# Patient Record
Sex: FEMALE | Race: WHITE | Employment: OTHER | URBAN - METROPOLITAN AREA
[De-identification: names, ages, dates, MRNs, and addresses within clinical notes are randomized per-mention and may not be internally consistent; named-entity substitution may affect disease eponyms.]

---

## 2017-04-10 ENCOUNTER — HOSPITAL ENCOUNTER (OUTPATIENT)
Dept: MAMMOGRAPHY | Facility: HOSPITAL | Age: 52
Discharge: HOME | End: 2017-04-10
Attending: OBSTETRICS & GYNECOLOGY
Payer: COMMERCIAL

## 2017-04-10 DIAGNOSIS — N63.0 BREAST MASS: ICD-10-CM

## 2017-04-10 PROCEDURE — 77066 DX MAMMO INCL CAD BI: CPT

## 2017-04-10 PROCEDURE — 77062 BREAST TOMOSYNTHESIS BI: CPT

## 2017-04-10 PROCEDURE — 76641 ULTRASOUND BREAST COMPLETE: CPT

## 2017-10-30 ENCOUNTER — HOSPITAL ENCOUNTER (OUTPATIENT)
Dept: MAMMOGRAPHY | Facility: HOSPITAL | Age: 52
Discharge: HOME | End: 2017-10-30
Attending: OBSTETRICS & GYNECOLOGY
Payer: COMMERCIAL

## 2017-10-30 DIAGNOSIS — N63.0 BREAST LUMP: ICD-10-CM

## 2017-10-30 PROCEDURE — 76642 ULTRASOUND BREAST LIMITED: CPT | Performed by: OBSTETRICS & GYNECOLOGY

## 2017-10-30 PROCEDURE — 77066 DX MAMMO INCL CAD BI: CPT | Performed by: OBSTETRICS & GYNECOLOGY

## 2017-10-30 PROCEDURE — 77062 BREAST TOMOSYNTHESIS BI: CPT | Performed by: OBSTETRICS & GYNECOLOGY

## 2017-10-30 NOTE — IMAGING NOTE
Asssisted Dr. London Olivas with recommendation for a left US breast biopsy for nodule. Emotional and educational support provided. Written information provided to Yaima Domingo.  Our breast center schedulers will call pt within 72 hours to schedule an appointmen

## 2017-12-13 PROBLEM — F41.1 ANXIETY STATE: Status: ACTIVE | Noted: 2017-12-13

## 2017-12-27 ENCOUNTER — HOSPITAL ENCOUNTER (OUTPATIENT)
Dept: MAMMOGRAPHY | Facility: HOSPITAL | Age: 52
Discharge: HOME | End: 2017-12-27
Attending: OBSTETRICS & GYNECOLOGY
Payer: COMMERCIAL

## 2017-12-27 DIAGNOSIS — N63.0 BREAST NODULE: ICD-10-CM

## 2017-12-27 PROCEDURE — 77065 DX MAMMO INCL CAD UNI: CPT | Performed by: OBSTETRICS & GYNECOLOGY

## 2017-12-27 PROCEDURE — 88305 TISSUE EXAM BY PATHOLOGIST: CPT | Performed by: OBSTETRICS & GYNECOLOGY

## 2017-12-27 PROCEDURE — 19083 BX BREAST 1ST LESION US IMAG: CPT | Performed by: OBSTETRICS & GYNECOLOGY

## 2017-12-27 NOTE — PROCEDURES
PROCEDURE: ULTRASOUND GUIDED BIOPSY OF THE LEFT BREAST, ONE SITE    COMPARISON: [facility] ADDISON 2D+3D DIAGNOSTIC MEREDITH CHEEK (FRU=[id number]), 10/30/2017, 13:09.     INDICATIONS: N63.0 Unspecified lump in unspecified breast    DESCRIPTION: The patient’s p

## 2018-01-02 ENCOUNTER — TELEPHONE (OUTPATIENT)
Dept: MAMMOGRAPHY | Facility: HOSPITAL | Age: 53
End: 2018-01-02

## 2018-05-24 ENCOUNTER — SURGERY (OUTPATIENT)
Age: 53
End: 2018-05-24

## 2018-05-24 ENCOUNTER — HOSPITAL ENCOUNTER (EMERGENCY)
Facility: HOSPITAL | Age: 53
Discharge: HOME | End: 2018-05-24
Attending: EMERGENCY MEDICINE | Admitting: INTERNAL MEDICINE
Payer: COMMERCIAL

## 2018-05-24 VITALS
TEMPERATURE: 98 F | HEART RATE: 72 BPM | SYSTOLIC BLOOD PRESSURE: 100 MMHG | WEIGHT: 127.88 LBS | BODY MASS INDEX: 22 KG/M2 | DIASTOLIC BLOOD PRESSURE: 55 MMHG | OXYGEN SATURATION: 96 % | RESPIRATION RATE: 18 BRPM

## 2018-05-24 DIAGNOSIS — T18.108A FOREIGN BODY IN ESOPHAGUS, INITIAL ENCOUNTER: Primary | ICD-10-CM

## 2018-05-24 PROBLEM — Z87.19 HISTORY OF ESOPHAGEAL STRICTURE: Status: ACTIVE | Noted: 2018-05-24

## 2018-05-24 PROCEDURE — 99285 EMERGENCY DEPT VISIT HI MDM: CPT

## 2018-05-24 PROCEDURE — 99152 MOD SED SAME PHYS/QHP 5/>YRS: CPT | Performed by: INTERNAL MEDICINE

## 2018-05-24 PROCEDURE — 96361 HYDRATE IV INFUSION ADD-ON: CPT

## 2018-05-24 PROCEDURE — 0DC38ZZ EXTIRPATION OF MATTER FROM LOWER ESOPHAGUS, VIA NATURAL OR ARTIFICIAL OPENING ENDOSCOPIC: ICD-10-PCS | Performed by: INTERNAL MEDICINE

## 2018-05-24 PROCEDURE — 96374 THER/PROPH/DIAG INJ IV PUSH: CPT

## 2018-05-24 RX ORDER — DIPHENHYDRAMINE HYDROCHLORIDE 50 MG/ML
INJECTION INTRAMUSCULAR; INTRAVENOUS
Status: DISCONTINUED | OUTPATIENT
Start: 2018-05-24 | End: 2018-05-24

## 2018-05-24 RX ORDER — SODIUM CHLORIDE, SODIUM LACTATE, POTASSIUM CHLORIDE, CALCIUM CHLORIDE 600; 310; 30; 20 MG/100ML; MG/100ML; MG/100ML; MG/100ML
INJECTION, SOLUTION INTRAVENOUS CONTINUOUS
Status: DISCONTINUED | OUTPATIENT
Start: 2018-05-24 | End: 2018-05-24

## 2018-05-24 RX ORDER — MIDAZOLAM HYDROCHLORIDE 1 MG/ML
INJECTION INTRAMUSCULAR; INTRAVENOUS
Status: DISCONTINUED | OUTPATIENT
Start: 2018-05-24 | End: 2018-05-24

## 2018-05-24 NOTE — OPERATIVE REPORT
PATIENT NAME: Gordo Morris  MRN: [id number]  DATE OF OPERATION: 5/24/2018  REFERRING PHYSICIAN: ER doctor  Medications:  Versed 4 mg IVP              Fentanyl 100 mcg IVP Benadryl 25 mg IVP  PREOPERATIVE DIAGNOSIS    Esophageal obstruction  POSTOPERATIVE D

## 2018-05-24 NOTE — ED INITIAL ASSESSMENT (HPI)
Pt states she has a piece of steak stuck in her esophagus, unable to tolerate her saliva, no respiratory distress.

## 2018-05-24 NOTE — CONSULTS
GASTROENTEROLOGY CONSULTATION  Franko Ashley MD    Department of Gastroenterology  [facility]    Johanne Pierson Patient Status:  Emergency    1965 MRN [id number]   [facility] ENDOSCOPY Attending No att. providers found in hair or nails. Bone/joint: No joint pain or inflammation  Heme/Lymphatic: Denies easy bruising, anemia, excessive bleeding, enlarging or painful lymph nodes.   Allergy: Denies medication allergy, latex/rubber allergy, anaphylactic or other reaction to a

## 2018-05-24 NOTE — ED PROVIDER NOTES
Patient Seen in: [facility] Emergency Department    History   Patient presents with:  FB in Throat (GI, respiratory)    Stated Complaint: foreign body    HPI    Patient complains of swallowed esophageal foreign body.   Patient was eating some steak a purulent nasal secretions or overlying sinus erythema. Throat: Posterior pharynx is normal.  Uvula midline nonswollen. Tongue is nonswollen. Oromucosa is moist.   Neck: Supple, nontender, with no crepitance  Lungs: Clear to auscultation bilaterally.   No

## 2018-05-24 NOTE — BRIEF OP NOTE
Pre-Operative Diagnosis: Food bolus     Post-Operative Diagnosis: esophageal obstruction from food bolus.   Esophageal stricture - incomplete egd     Procedure Performed:   Procedure(s):  ESOPHAGOGASTRODUODENOSCOPY    Surgeon(s) and Role:     * Rafi Dickens

## 2018-11-01 PROBLEM — G47.00 INSOMNIA: Status: ACTIVE | Noted: 2018-11-01

## 2018-11-01 PROBLEM — F32.A DEPRESSION: Status: ACTIVE | Noted: 2018-11-01

## 2019-06-10 ENCOUNTER — APPOINTMENT (OUTPATIENT)
Dept: GENERAL RADIOLOGY | Age: 54
End: 2019-06-10
Attending: FAMILY MEDICINE
Payer: COMMERCIAL

## 2019-06-10 ENCOUNTER — HOSPITAL ENCOUNTER (OUTPATIENT)
Age: 54
Discharge: HOME | End: 2019-06-10
Attending: FAMILY MEDICINE
Payer: COMMERCIAL

## 2019-06-10 VITALS
WEIGHT: 130 LBS | HEIGHT: 64 IN | TEMPERATURE: 99 F | BODY MASS INDEX: 22.2 KG/M2 | DIASTOLIC BLOOD PRESSURE: 80 MMHG | SYSTOLIC BLOOD PRESSURE: 147 MMHG | OXYGEN SATURATION: 100 % | HEART RATE: 67 BPM | RESPIRATION RATE: 17 BRPM

## 2019-06-10 DIAGNOSIS — R07.89 ACUTE CHEST WALL PAIN: Primary | ICD-10-CM

## 2019-06-10 PROCEDURE — 85378 FIBRIN DEGRADE SEMIQUANT: CPT | Performed by: FAMILY MEDICINE

## 2019-06-10 PROCEDURE — 99205 OFFICE O/P NEW HI 60 MIN: CPT

## 2019-06-10 PROCEDURE — 71046 X-RAY EXAM CHEST 2 VIEWS: CPT | Performed by: FAMILY MEDICINE

## 2019-06-10 PROCEDURE — 85025 COMPLETE CBC W/AUTO DIFF WBC: CPT | Performed by: FAMILY MEDICINE

## 2019-06-10 PROCEDURE — 80047 BASIC METABLC PNL IONIZED CA: CPT

## 2019-06-10 PROCEDURE — 93005 ELECTROCARDIOGRAM TRACING: CPT

## 2019-06-10 PROCEDURE — 96374 THER/PROPH/DIAG INJ IV PUSH: CPT

## 2019-06-10 PROCEDURE — 84484 ASSAY OF TROPONIN QUANT: CPT

## 2019-06-10 PROCEDURE — 99215 OFFICE O/P EST HI 40 MIN: CPT

## 2019-06-10 PROCEDURE — 93010 ELECTROCARDIOGRAM REPORT: CPT

## 2019-06-10 RX ORDER — KETOROLAC TROMETHAMINE 30 MG/ML
30 INJECTION, SOLUTION INTRAMUSCULAR; INTRAVENOUS ONCE
Status: COMPLETED | OUTPATIENT
Start: 2019-06-10 | End: 2019-06-10

## 2019-06-10 NOTE — ED PROVIDER NOTES
Patient Seen in: [facility]    History   Patient presents with:  Chest Pain    Stated Complaint: chest pain radiating into back    HPI    This 52-year-old female presents to the office with complaint of left-sided chest pain whi HPI.  Constitutional and vital signs reviewed. All other systems reviewed and negative except as noted above.     Physical Exam     ED Triage Vitals [06/10/19 1207]   /78   Pulse 79   Resp 18   Temp 98.6 °F (37 °C)   Temp src Temporal   SpO2 100 since 0530. Patient also has pain in her left shoulder blade that increases with movement. FINDINGS:  Lung volumes are large which may reflect underlying central or reactive airways disease. No focal consolidation or pleural effusion.   There a few scat doctor in 3 to 5 days if not improving.

## 2019-06-10 NOTE — ED INITIAL ASSESSMENT (HPI)
Patient states developed mid sternal chest pain and states radiates to the back  Took 600 mg ibuprofen with no relief  Denies any SOB  Denies any cold symptoms or cough

## 2021-01-25 ENCOUNTER — TELEPHONE (OUTPATIENT)
Dept: INTERNAL MEDICINE CLINIC | Facility: CLINIC | Age: 56
End: 2021-01-25

## 2021-01-25 DIAGNOSIS — R05.9 COUGH: Primary | ICD-10-CM

## 2021-01-25 NOTE — TELEPHONE ENCOUNTER
Pt's  Manuel Rosenberg) is a current pt under Dr Ruth Ferris [facility]. Pt's  requested a covid19 due to \"persistant cough for a few weeks\"    Per psr pt's  stated pt will be signing up for [facility] as well. Lab test pending.

## 2021-01-25 NOTE — TELEPHONE ENCOUNTER
Order signed. Praneeth Erwin. Emiliano Mathias MD  Diplomate, American Board of Internal Medicine  Member, [facility] ([email or web address])  Affiliate Physician, [facility] ([email or web address])

## 2021-01-26 ENCOUNTER — LAB ENCOUNTER (OUTPATIENT)
Dept: LAB | Facility: HOSPITAL | Age: 56
End: 2021-01-26
Attending: INTERNAL MEDICINE
Payer: COMMERCIAL

## 2021-01-26 DIAGNOSIS — R05.9 COUGH: ICD-10-CM

## 2021-01-27 LAB — SARS-COV-2 RNA RESP QL NAA+PROBE: NOT DETECTED

## 2021-02-16 ENCOUNTER — OFFICE VISIT (OUTPATIENT)
Dept: INTERNAL MEDICINE CLINIC | Facility: CLINIC | Age: 56
End: 2021-02-16
Payer: COMMERCIAL

## 2021-02-16 VITALS
DIASTOLIC BLOOD PRESSURE: 82 MMHG | OXYGEN SATURATION: 97 % | HEART RATE: 79 BPM | HEIGHT: 64.5 IN | WEIGHT: 130 LBS | SYSTOLIC BLOOD PRESSURE: 128 MMHG | TEMPERATURE: 98 F | BODY MASS INDEX: 21.93 KG/M2

## 2021-02-16 DIAGNOSIS — Z12.31 SCREENING MAMMOGRAM, ENCOUNTER FOR: ICD-10-CM

## 2021-02-16 DIAGNOSIS — Z13.6 SCREENING FOR HEART DISEASE: ICD-10-CM

## 2021-02-16 DIAGNOSIS — Z12.12 SCREENING FOR COLORECTAL CANCER: ICD-10-CM

## 2021-02-16 DIAGNOSIS — Z00.00 ROUTINE GENERAL MEDICAL EXAMINATION AT A HEALTH CARE FACILITY: Primary | ICD-10-CM

## 2021-02-16 DIAGNOSIS — Z12.11 SCREENING FOR COLORECTAL CANCER: ICD-10-CM

## 2021-02-16 PROCEDURE — 3008F BODY MASS INDEX DOCD: CPT | Performed by: INTERNAL MEDICINE

## 2021-02-16 PROCEDURE — 3079F DIAST BP 80-89 MM HG: CPT | Performed by: INTERNAL MEDICINE

## 2021-02-16 PROCEDURE — 3074F SYST BP LT 130 MM HG: CPT | Performed by: INTERNAL MEDICINE

## 2021-02-16 PROCEDURE — 99386 PREV VISIT NEW AGE 40-64: CPT | Performed by: INTERNAL MEDICINE

## 2021-02-17 PROBLEM — F32.A DEPRESSION: Status: RESOLVED | Noted: 2018-11-01 | Resolved: 2021-02-17

## 2021-02-17 PROBLEM — Z87.19 HISTORY OF ESOPHAGEAL STRICTURE: Status: RESOLVED | Noted: 2018-05-24 | Resolved: 2021-02-17

## 2021-02-17 PROBLEM — T18.108A FOREIGN BODY IN ESOPHAGUS, INITIAL ENCOUNTER: Status: RESOLVED | Noted: 2018-05-24 | Resolved: 2021-02-17

## 2021-02-17 PROBLEM — F41.1 ANXIETY STATE: Status: RESOLVED | Noted: 2017-12-13 | Resolved: 2021-02-17

## 2021-02-17 NOTE — PROGRESS NOTES
Patient presents with:  Establish Care: here to establish care.       HPI: Jerel Galvez is a 49 y/o WF, new patient, here to establish PCP and to undergo [facility] Annual Wellness Program. She can return to our lab to undergo appropriate testing of the various compone grossly intact, no focal deficits; 2+ DTRs  Psych - nl mood/affect      A/P:  Routine general medical examination at a health care facility  (primary encounter diagnosis)  Screening mammogram, encounter for  Screening for heart disease  Screening for color

## 2021-03-03 ENCOUNTER — NURSE ONLY (OUTPATIENT)
Dept: INTERNAL MEDICINE CLINIC | Facility: CLINIC | Age: 56
End: 2021-03-03
Payer: COMMERCIAL

## 2021-03-03 VITALS — BODY MASS INDEX: 22.53 KG/M2 | WEIGHT: 132 LBS | HEIGHT: 64 IN

## 2021-03-03 DIAGNOSIS — Z00.00 LABORATORY EXAM ORDERED AS PART OF ROUTINE GENERAL MEDICAL EXAMINATION: Primary | ICD-10-CM

## 2021-03-03 LAB
AMB EXT CHOLESTEROL, TOTAL: 162 MG/DL
AMB EXT COVID-19 IGG: POSITIVE
AMB EXT CREATININE: 0.76 MG/DL
AMB EXT GLUCOSE: 93 MG/DL
AMB EXT HDL CHOLESTEROL: 84 MG/DL
AMB EXT HEMATOCRIT: 40.8
AMB EXT HEMOGLOBIN: 13.7
AMB EXT LDL CHOLESTEROL, DIRECT: 64 MG/DL
AMB EXT MCV: 92.7
AMB EXT PLATELETS: 224
AMB EXT TRIGLYCERIDES: 55 MG/DL
AMB EXT TSH: 2.51 MIU/ML
AMB EXT WBC: 8.8 X10(3)UL
BILIRUB UR QL STRIP.AUTO: NEGATIVE
CLARITY UR REFRACT.AUTO: CLEAR
COLOR UR AUTO: YELLOW
GLUCOSE UR STRIP.AUTO-MCNC: NEGATIVE MG/DL
KETONES UR STRIP.AUTO-MCNC: NEGATIVE MG/DL
LEUKOCYTE ESTERASE UR QL STRIP.AUTO: NEGATIVE
NITRITE UR QL STRIP.AUTO: NEGATIVE
PH UR STRIP.AUTO: 7 [PH] (ref 5–8)
PROT UR STRIP.AUTO-MCNC: NEGATIVE MG/DL
RBC UR QL AUTO: NEGATIVE
SP GR UR STRIP.AUTO: 1.01 (ref 1–1.03)
UROBILINOGEN UR STRIP.AUTO-MCNC: <2 MG/DL

## 2021-03-03 PROCEDURE — 3008F BODY MASS INDEX DOCD: CPT | Performed by: INTERNAL MEDICINE

## 2021-03-03 PROCEDURE — 93000 ELECTROCARDIOGRAM COMPLETE: CPT | Performed by: INTERNAL MEDICINE

## 2021-03-03 PROCEDURE — 99173 VISUAL ACUITY SCREEN: CPT | Performed by: INTERNAL MEDICINE

## 2021-03-03 PROCEDURE — 92551 PURE TONE HEARING TEST AIR: CPT | Performed by: INTERNAL MEDICINE

## 2021-03-03 PROCEDURE — 81003 URINALYSIS AUTO W/O SCOPE: CPT | Performed by: INTERNAL MEDICINE

## 2021-03-03 NOTE — PROGRESS NOTES
*BODY COMPOSITION:    YES: X      NO:       REASON TEST NOT PERFORMED:   _____________________________________________________________________________  *VENIPUNCTURE    YES:  X     NO:        REASON VENIPUNCTURE NOT PERFORMED:      LEFT A/C:     LEFT HAND:

## 2021-03-12 ENCOUNTER — MED REC SCAN ONLY (OUTPATIENT)
Dept: INTERNAL MEDICINE CLINIC | Facility: CLINIC | Age: 56
End: 2021-03-12

## 2021-03-18 ENCOUNTER — TELEPHONE (OUTPATIENT)
Dept: INTERNAL MEDICINE CLINIC | Facility: CLINIC | Age: 56
End: 2021-03-18

## 2021-03-18 NOTE — TELEPHONE ENCOUNTER
Left message X2 for patient to call to make phone appointment for Dr. Rockne Najjar to review test results from CPE. Most recent call was 3/12/21. No response as of today.

## 2021-03-23 ENCOUNTER — VIRTUAL PHONE E/M (OUTPATIENT)
Dept: INTERNAL MEDICINE CLINIC | Facility: CLINIC | Age: 56
End: 2021-03-23
Payer: COMMERCIAL

## 2021-03-23 DIAGNOSIS — R89.9 ABNORMAL LABORATORY TEST RESULT: Primary | ICD-10-CM

## 2021-03-23 PROCEDURE — 99214 OFFICE O/P EST MOD 30 MIN: CPT | Performed by: INTERNAL MEDICINE

## 2021-03-23 NOTE — PROGRESS NOTES
Virtual Telephone Check-In    Molly Naranjo verbally consents to a Virtual/Telephone Check-In visit on 03/23/21. Patient has been referred to the [facility] website at [email or web address] to review the yearly Consent to Treat document.     Patient un lbs].    Body Composition Analysis via [employer] Professional machine done on 3/3/21:  Current Body Weight: 132.0 lbs. Total Body Fat: 26.7 % and 34.7 lbs. Visceral Fat: 5. Fat-Free Mass: 73.2 % and 95.3 lbs. Total Body Water: 51.1 % and 66.5 lbs. in this encounter       Imaging & Consults:  None

## 2021-03-28 ENCOUNTER — LAB ENCOUNTER (OUTPATIENT)
Dept: LAB | Facility: HOSPITAL | Age: 56
End: 2021-03-28
Attending: INTERNAL MEDICINE
Payer: COMMERCIAL

## 2021-03-28 DIAGNOSIS — Z01.818 PRE-OP TESTING: ICD-10-CM

## 2021-03-31 PROBLEM — D12.3 BENIGN NEOPLASM OF TRANSVERSE COLON: Status: ACTIVE | Noted: 2021-03-31

## 2021-03-31 PROBLEM — Z12.11 SPECIAL SCREENING FOR MALIGNANT NEOPLASMS, COLON: Status: ACTIVE | Noted: 2021-03-31

## 2021-11-09 ENCOUNTER — HOSPITAL ENCOUNTER (OUTPATIENT)
Dept: MAMMOGRAPHY | Facility: HOSPITAL | Age: 56
Discharge: HOME | End: 2021-11-09
Attending: INTERNAL MEDICINE
Payer: COMMERCIAL

## 2021-11-09 DIAGNOSIS — Z12.31 SCREENING MAMMOGRAM, ENCOUNTER FOR: ICD-10-CM

## 2021-11-09 PROCEDURE — 77067 SCR MAMMO BI INCL CAD: CPT | Performed by: INTERNAL MEDICINE

## 2021-11-09 PROCEDURE — 77063 BREAST TOMOSYNTHESIS BI: CPT | Performed by: INTERNAL MEDICINE

## 2021-11-12 ENCOUNTER — HOSPITAL ENCOUNTER (OUTPATIENT)
Dept: MAMMOGRAPHY | Facility: HOSPITAL | Age: 56
Discharge: HOME | End: 2021-11-12
Attending: INTERNAL MEDICINE
Payer: COMMERCIAL

## 2021-11-12 DIAGNOSIS — R92.2 INCONCLUSIVE MAMMOGRAM: ICD-10-CM

## 2021-11-12 PROCEDURE — 77061 BREAST TOMOSYNTHESIS UNI: CPT | Performed by: INTERNAL MEDICINE

## 2021-11-12 PROCEDURE — 76642 ULTRASOUND BREAST LIMITED: CPT | Performed by: INTERNAL MEDICINE

## 2021-11-12 PROCEDURE — 77065 DX MAMMO INCL CAD UNI: CPT | Performed by: INTERNAL MEDICINE

## 2022-01-26 ENCOUNTER — TELEPHONE (OUTPATIENT)
Dept: INTERNAL MEDICINE CLINIC | Facility: CLINIC | Age: 57
End: 2022-01-26

## 2022-01-26 NOTE — TELEPHONE ENCOUNTER
Patient injured her left knee 3 weeks ago while attending ballet classes. Patient took a break from ballet, however today climbing stairs the left knee collapsed. Its hard to apply any weight on the left knee.  Please call

## 2022-01-26 NOTE — TELEPHONE ENCOUNTER
Pt called w c/o L knee pain r/t injury. - Per pt able to put weight some weight, however, feels knee megan   - Onset: X3 weeks ago, after ballet injury.  increased pain today after going up the stairs.   - Location: L knee  - Radiation: No  - Type of p

## 2022-01-27 ENCOUNTER — OFFICE VISIT (OUTPATIENT)
Dept: INTERNAL MEDICINE CLINIC | Facility: CLINIC | Age: 57
End: 2022-01-27
Payer: COMMERCIAL

## 2022-01-27 VITALS
RESPIRATION RATE: 16 BRPM | OXYGEN SATURATION: 96 % | WEIGHT: 125.81 LBS | BODY MASS INDEX: 21.48 KG/M2 | HEIGHT: 64 IN | DIASTOLIC BLOOD PRESSURE: 78 MMHG | TEMPERATURE: 98 F | HEART RATE: 107 BPM | SYSTOLIC BLOOD PRESSURE: 136 MMHG

## 2022-01-27 DIAGNOSIS — S83.115A: Primary | ICD-10-CM

## 2022-01-27 DIAGNOSIS — M23.92 INTERNAL DERANGEMENT OF LEFT KNEE: ICD-10-CM

## 2022-01-27 DIAGNOSIS — M12.562 TRAUMATIC ARTHROPATHY OF LEFT KNEE: ICD-10-CM

## 2022-01-27 PROCEDURE — 3075F SYST BP GE 130 - 139MM HG: CPT | Performed by: INTERNAL MEDICINE

## 2022-01-27 PROCEDURE — 99214 OFFICE O/P EST MOD 30 MIN: CPT | Performed by: INTERNAL MEDICINE

## 2022-01-27 PROCEDURE — 3078F DIAST BP <80 MM HG: CPT | Performed by: INTERNAL MEDICINE

## 2022-01-27 PROCEDURE — 3008F BODY MASS INDEX DOCD: CPT | Performed by: INTERNAL MEDICINE

## 2022-01-27 RX ORDER — IBUPROFEN 200 MG
200 TABLET ORAL EVERY 6 HOURS PRN
COMMUNITY

## 2022-01-27 NOTE — PROGRESS NOTES
Patient presents with:  Knee Pain: L knee pain       HPI: The patient presents for L knee pain evaluation:  Onset/Duration = 3 weeks ago  Location = L knee, medial aspect  Radiation = none  Mechanism of injury = she started taking ballet classes  Quality o : 130 lb (59 kg)  05/24/18 : 127 lb 13.9 oz (58 kg)  Gen - A&Ox3, NAD  L knee - grossly swollen; (+) Isidro's; there is tenderness along with medial joint line as well as with moderate palpation of the patella itself; abnormal laxity of the patella; (-)

## 2022-02-02 ENCOUNTER — OFFICE VISIT (OUTPATIENT)
Dept: ORTHOPEDICS CLINIC | Facility: CLINIC | Age: 57
End: 2022-02-02
Payer: COMMERCIAL

## 2022-02-02 VITALS — HEIGHT: 64 IN | BODY MASS INDEX: 20.49 KG/M2 | WEIGHT: 120 LBS

## 2022-02-02 DIAGNOSIS — S83.005A DISLOCATION OF LEFT PATELLA, INITIAL ENCOUNTER: Primary | ICD-10-CM

## 2022-02-02 DIAGNOSIS — S83.232A COMPLEX TEAR OF MEDIAL MENISCUS OF LEFT KNEE AS CURRENT INJURY, INITIAL ENCOUNTER: ICD-10-CM

## 2022-02-02 PROCEDURE — 99204 OFFICE O/P NEW MOD 45 MIN: CPT | Performed by: ORTHOPAEDIC SURGERY

## 2022-02-02 PROCEDURE — 3008F BODY MASS INDEX DOCD: CPT | Performed by: ORTHOPAEDIC SURGERY

## 2022-02-02 RX ORDER — MELOXICAM 15 MG/1
15 TABLET ORAL DAILY
Qty: 14 TABLET | Refills: 1 | Status: SHIPPED | OUTPATIENT
Start: 2022-02-02

## 2022-02-08 ENCOUNTER — TELEPHONE (OUTPATIENT)
Dept: PHYSICAL THERAPY | Facility: HOSPITAL | Age: 57
End: 2022-02-08

## 2022-02-09 ENCOUNTER — OFFICE VISIT (OUTPATIENT)
Dept: PHYSICAL THERAPY | Age: 57
End: 2022-02-09
Attending: ORTHOPAEDIC SURGERY
Payer: COMMERCIAL

## 2022-02-09 DIAGNOSIS — S83.005A DISLOCATION OF LEFT PATELLA, INITIAL ENCOUNTER: ICD-10-CM

## 2022-02-09 PROCEDURE — 97140 MANUAL THERAPY 1/> REGIONS: CPT

## 2022-02-09 PROCEDURE — 97161 PT EVAL LOW COMPLEX 20 MIN: CPT

## 2022-02-09 PROCEDURE — 97110 THERAPEUTIC EXERCISES: CPT

## 2022-02-09 NOTE — PROGRESS NOTES
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 I took a 20-year hiatus and then tried to get back into it. I started getting back into it in the fall, just once a week. I think this initially happened while I was working out at home, I felt a crack in my knee. I was able to finish the workout. I went to do a workout at a new studio and then had pain in my knees after the first workout. I did three workouts that week and it didn't feel good. I went and did some exercises at home to try and get it better and I went back and did one class with wearing a brace. After the next class, I still had issues. Two days later, while wearing the brace, my knee collapsed on me while I was carrying laundry up the stairs. I pushed the kneecap back in place and felt a crack. I've been afraid to go up stairs with the left leg ever since, which was two weeks ago. I immediately called my internist who did an x-ray and an MRI. The ortho went over the MRI. There's two tears in the meniscus and a partial tear in the ACL. \"    \"I have pain everyday. Yesterday, I picked up some dog poop in the backyard and even just that, I had pain coming out of that. I can tell that the left leg is starting to atrophy because I'm doing a lot of my left knee. \"     DOI/S: 1/6/22    Aggravating Factors: sitting cross-legged, standing > 10-15 minutes, walking long distances, stairs. Alleviating Factors: anti-inflammatory medication     PMH: The patient's PMH was reviewed with the patient including allergies, medications, and surgical and medical history. No previous knee injuries.      PLF/Personal Goals: ballet, hike and walk with her dog, 3-5 miles. Looking for shoe recommendations. Occupation: stay-at-home mom     OBJECTIVE:     Pain/Symptom Presentation: Patient reports pain over the medial knee. Pain at rest: 2/10  Pain at worst: 6-7/10    Activity Measures:  Sleeping: Mild Activity Limitation: Patient reports throbbing over the medial side of the knee. Sitting: Mild Activity Limitation: Patient is able to sit for up to 30 minutes before disruption due to symptoms. Standing: Moderate Activity Limitation: Patient is able to stand < 10-15 minutes before disruption due to symptoms. Walking: Moderate Activity Limitation: Patient is able to walk < 1 mile before disruption due to symptoms. Driving: No Activity Limitation: Patient is able to drive without limitations. Squatting: Moderate Activity Limitation: Patient is able to squat < 45deg before disruption due to symptoms. Ascending Stairs: Severe Activity Limitation: Patient navigates up the stairs is only able to ascend stairs with step-to gait pattern,   Descending Stairs: Severe Activity Limitation: Patient navigates down the stairs with step-to gait pattern. Stairs: Severe Activity Limitation: Patient is able to navigate 1-2 flights of stairs with step-to gait pattern. Inspection/Observation: Patient demonstrates mild suprapatellar swelling over left knee. No apparent bruising or deformity. Gait: Patient demonstrates antalgic gait upon entering the clinic, limiting stance time on her involved LE. Palpation: Patient demonstrates diffuse swelling in the posterior knee. Demonstrates good PFJ mobility. Sensation: Patient report intact sensation.      ROM:   Knee Motion PROM AROM    Right Left Right Left   Knee Extension N/A N/A 0 0   Knee Flexion 140 125* N/A N/a   *indicates activity was associated with pain    Strength/MMT:   Knee Motion Strength    Right Left   Knee Extension 5/5 3-/5   Knee Flexion 4+/5 3-/5   *indicates activity was associated with pain    Hip Motion Strength    Right Left   Hip Flexion 5/5 4/5   Hip Extension 4/5 4-/5   Hip ABD 4/5 4-/5   Hip ER 5/5 4/5   Hip IR  5/5 4/5   *indicates activity was associated with pain    Special Tests:   Knee Special Tests:   Patellofemoral:   Patellar Apprehension: (R) (-), (L) (-)  Patellar Compression: (R) (-), (L) (-)  Ligamentous:  Varus Stress Test: (R) (-), (L) (-)  Valgus Stress Test: (R) (-), (L) (+)  Anterior Drawer: (R) (-), (L) (+)  Lachman's: (R) (-), (L) (+)  Posterior Drawer: (R) (-), (L) (-)  Meniscal:   Joint Line Tenderness: (+)  Pain with passive end range flexion: (+)  Pain with passive end range extension: (-)   Isidro's: (R) (-), (L) (+)    ASSESSMENT:     Toshia Lauren is a 55year old female that presents to physical therapy evaluation s/p (L) knee injury sustained 1/6/22 when she was carrying a load of laundry up the stairs and her knee buckled on her, collapsing medially. MRI findings show an posterior horn medial meniscus tear, anterior lateral meniscal tear, and ACL sprain. Clinical findings may indicate possible MCL strain as well. The patient is with complaints of suprapatellar swelling in her knee and episodes of hyperextension instability at her left knee. The patient was educated on the rationale for physical therapy and the poor likelihood for spontaneous healing on the meniscus. The patient demonstrates antalgic gait upon entering the clinic as well as anticipated deficits in pain management and strength associated with injury. The patient admits that she has been avoiding stairs since her injury, navigating up and down with a step-to gait pattern. Current functional limitation include but are not limited to prolonged standing, walking long distances, squatting, and navigating stairs. The patient would like to resume recreational ballet participation as well hiking long distances with her dog.  The patient would benefit from physical therapy to facilitate normalized knee pain management, swelling management, ROM, strength, endurance, and stability for full return to ADL's and PLF. Precautions/WB Status: WBAT  Education or Treatment Limitation(s): None  Rehab Potential: Good    TREATMENT:     Initial Evaluation: x 10min     Therapeutic Exercise: x 15min  DL bridging: x 20   S/L clamshell: 2 x 10 (B), red theraband  Patient Education: Patient was educated on anatomy and pathophysiology of current condition, rationale for physical therapy, anticipated treatment interventions, prognosis, timeline for recovery, and expected functional outcomes based on evaluative findings. Patient was educated on the importance of compliance with consistent treatment and HEP to achieve mutually established goals. All patient's questions were answered and the patient denied further comments, complaints, or concerns upon departure. Administered HEP: Reviewed HEP handout, exercise selection, and recommended resistance. Provided verbal and written instructions/cueing for proper technique and common errors/compensations as needed. Neuromuscular Re-education: x 10min  Quad set: 2 x 10 x 5\" (L) with towel roll behind knee (for quad activation)  SLR: attempted but d/c due to pain  SAQ: x 20 (L) (for quad activation)     Manual Therapy: x 10min  Soft tissue mobilization to left knee: quads, adductors, ITB/VL, posterior knee    Home Exercise Program: Patient was issued a HEP handout 2/9/2022 including quad set, SAQ, DL bridging, and S/L clamshell. Charges: PT Eval Low Complexity Complexity x 1, Therex x 1, MT x 1  Total Timed Treatment: 35min       Total Treatment Time: 45min     PLAN OF CARE:      Goals:  Short-Term Goals:  1. The patient will improve quad strength and single-leg stance stability to demonstrate non-antalgic gait pattern with walking medium-length distances for household ambulation. Timeframe: 2-3 weeks.   2. The patient will improve LE strength and endurance to facilitate intermittent standing for extended periods of time for 2-3 hours daily for household and community ambulation. Timeframe: 2-3 weeks. Long-Term Goals:  1. The patient will improve LE strength and endurance to facilitate walking distances of 1-2 mile(s) daily for community integration. Timeframe: 4 weeks. 2. Patient will improve lower motor control to perform double-leg squat with symmetrical loading, without asymmetries, and with proper posterior chain loading to facilitate squatting and lifting ADL's. Timeframe: 4 weeks. 3. Patient will improve LE mobility, strength, and single-leg stabilization to meet strength requirements for reciprocal stair navigation pattern with no asymmetries for ascending and descending at least 3 flights of stairs daily for household and community ambulation. Timeframe: 6 weeks. Plan Frequency / Duration: Patient will be seen for 2x/week for 6 weeks, for a total of 12 visits, over a 90 day period. We will re-evaluate the patient at that time in order to determine functional progress, evaluate short-term goal completion, and establish an updated plan of care. Possible treatment interventions will/may include: Therapeutic Activities, Therapeutic Exercise, Neuromuscular Re-education, Manual Therapy, Home Exercise Program Instruction, Patient Education, Self-Care/Home Management, Gait Training, and Modalities as needed. Patient/Family was advised of these findings, precautions, and treatment options and has agreed to actively participate in planning and for this course of care. Thank you for your referral. Please co-sign or sign and return this letter via fax as soon as possible to [phone]. If you have any questions, please contact me at Dept: [phone].     Sincerely,    X___Hayley Sosa PT, DPT, SCS, ATC, CSCS____ Date: __2/9/2022__________    Electronically signed by therapist: Heidi Mendez PT  [de-identified] certification required: Yes  I certify the need for these services furnished under this plan of treatment and while under my care.

## 2022-02-11 ENCOUNTER — OFFICE VISIT (OUTPATIENT)
Dept: PHYSICAL THERAPY | Age: 57
End: 2022-02-11
Attending: ORTHOPAEDIC SURGERY
Payer: COMMERCIAL

## 2022-02-11 PROCEDURE — 97112 NEUROMUSCULAR REEDUCATION: CPT

## 2022-02-11 PROCEDURE — 97140 MANUAL THERAPY 1/> REGIONS: CPT

## 2022-02-11 PROCEDURE — 97110 THERAPEUTIC EXERCISES: CPT

## 2022-02-11 NOTE — PROGRESS NOTES
Date of Service: 2/11/2022  Dx: Dislocation of left patella, subsequent encounter (S83.005D)                Insurance: [address] [place] [address]  Insurance Limits: 60 visit limit  Visit #: 2  Authorized # of Visits: N/A  POC/Auth Expiration: N/A  Date of Last PN: 2/9/22 (Visit #1)  Authorizing Physician/Provider: Donta Urbano MD visit: N/A  Fall Risk: Standard         Precautions: None        Subjective: The patient reports compliance with her HEP which she is doing on the floor at home. The patient reports that icing before bed did seem to help her ability to sleep through the night. Pain/Symptom Presentation: Patient reports some anterior knee pain. Pain at rest: 0/10  Pain at worst: 4-5/10    Objective:   ROM:   Knee Motion PROM AROM    Right Left Right Left   Knee Extension N/A N/A 0 0   Knee Flexion 140 125* N/A N/a   *indicates activity was associated with pain    Strength/MMT:   Knee Motion Strength    Right Left   Knee Extension 5/5 3-/5   Knee Flexion 4+/5 3-/5   *indicates activity was associated with pain    Hip Motion Strength    Right Left   Hip Flexion 5/5 4/5   Hip Extension 4/5 4-/5   Hip ABD 4/5 4-/5   Hip ER 5/5 4/5   Hip IR  5/5 4/5   *indicates activity was associated with pain    Treatment:    Therapeutic Exercise: x 15min  DL bridging: x 20   S/L clamshell: 2 x 10 (B), red theraband  S/L hip ABD: 2 x 10 (B)   Standing hip ABD: attempted but d/c due to pain    Neuromuscular Re-education: x 15min  Quad set: 2 x 10 x 5\" (L) with towel roll behind knee (for quad activation)  SAQ: x 20 (L) (for quad activation)   CKC knee extension into SB: 2 x 10 x 3\" (L)   Tandem stance balance x 20\" (B)     Manual Therapy: x 10min  Soft tissue mobilization to left knee: quads, adductors, ITB/VL, posterior knee    Provider Interactions With Patient:   Verbal and manual cueing on proper performance of the prescribed exercises.    Progressed activity/exercise intensity following assessment of program, performance, and tolerance. Assessment: Jose Hoskins is still with medial knee pain and complaints of \"weakness\" likely due to ligamentous strain to that area. The patient shows improved quad activation this date. We progressed hip ABD/glute strengthening exercises. In attempting to add some WB exercises, the patient did have some mild medial knee pain, so we limited these activities. The patient would benefit from continued physical therapy to further progression in strength and stability of the left knee. Goals:   Short-Term Goals:  1. The patient will improve quad strength and single-leg stance stability to demonstrate non-antalgic gait pattern with walking medium-length distances for household ambulation. Timeframe: 2-3 weeks. 2. The patient will improve LE strength and endurance to facilitate intermittent standing for extended periods of time for 2-3 hours daily for household and community ambulation. Timeframe: 2-3 weeks. Long-Term Goals:  1. The patient will improve LE strength and endurance to facilitate walking distances of 1-2 mile(s) daily for community integration. Timeframe: 4 weeks. 2. Patient will improve lower motor control to perform double-leg squat with symmetrical loading, without asymmetries, and with proper posterior chain loading to facilitate squatting and lifting ADL's. Timeframe: 4 weeks. Patient will improve LE mobility, strength, and single-leg stabilization to meet strength requirements for reciprocal stair navigation pattern with no asymmetries for ascending and descending at least 3 flights of stairs daily for household and community ambulation. Timeframe: 6 weeks    Plan: Continue to progress LE strengthening and dynamic knee stability. HEP: Patient was issued a HEP handout 2/9/2022 including quad set, SAQ, DL bridging, and S/L clamshell.        Charges: MT x 1, Therex x 1, NMR x 1         Total Timed Treatment: 40min    Total Treatment Time: 40min

## 2022-02-14 ENCOUNTER — OFFICE VISIT (OUTPATIENT)
Dept: PHYSICAL THERAPY | Age: 57
End: 2022-02-14
Attending: ORTHOPAEDIC SURGERY
Payer: COMMERCIAL

## 2022-02-14 PROCEDURE — 97140 MANUAL THERAPY 1/> REGIONS: CPT

## 2022-02-14 PROCEDURE — 97110 THERAPEUTIC EXERCISES: CPT

## 2022-02-14 PROCEDURE — 97112 NEUROMUSCULAR REEDUCATION: CPT

## 2022-02-14 NOTE — PROGRESS NOTES
Date of Service: 2/14/2022  Dx: Dislocation of left patella, subsequent encounter (S83.005D)                Insurance: [address] [place] [address]  Insurance Limits: 60 visit limit  Visit #: 3  Authorized # of Visits: N/A  POC/Auth Expiration: N/A  Date of Last PN: 2/9/22 (Visit #1)  Authorizing Physician/Provider: Donta Urbano MD visit: N/A  Fall Risk: Standard         Precautions: None        Subjective: \"What I'm noticing is that if I do anything a little extra, I'm having some more pain in the back of the knee. If I give it a good rub, then I feel like I warm up the area. It surprised me that it was swelling up because it felt like my knee would give out again. \"    Pain/Symptom Presentation: Patient denies any changes in pain ratings 2/14/22.      Pain at rest: 0/10  Pain at worst: 4-5/10    Objective:   ROM:   Knee Motion PROM AROM    Right Left Right Left   Knee Extension N/A N/A 0 0   Knee Flexion 140 125* N/A N/a   *indicates activity was associated with pain    Strength/MMT:   Knee Motion Strength    Right Left   Knee Extension 5/5 3-/5   Knee Flexion 4+/5 3-/5   *indicates activity was associated with pain    Hip Motion Strength    Right Left   Hip Flexion 5/5 4/5   Hip Extension 4/5 4-/5   Hip ABD 4/5 4-/5   Hip ER 5/5 4/5   Hip IR  5/5 4/5   *indicates activity was associated with pain    Treatment:    Therapeutic Exercise: x 20min  DL bridging: x 20   S/L clamshell: 2 x 10 (B), red theraband  S/L hip ABD: 2 x 10 (B)   Shuttle DLP: x 20, 2 cords  Shuttle SLP x 20 (L) 1 cord    Neuromuscular Re-education: x 15min  Quad set: 2 x 10 x 5\" (L) with towel roll behind knee (for quad activation)  SAQ: x 20 (L), 1.5# ankle weight (for quad activation)  CKC knee extension into SB: 2 x 10 x 3\" (L)   Tandem stance balance x 20\" (B)     Manual Therapy: x 10min  Soft tissue mobilization to left knee: quads, adductors, ITB/VL, posterior knee    Provider Interactions With Patient:   Verbal and manual cueing on proper performance of the prescribed exercises. Assessment: Brianna Gutierres reports fluctuations in the swelling in the posterior knee. The patient still reports a sensation of weakness in her quad. We progressed quad strengthening exercises this date. The patient reports that she is still nervous and cautious when navigating stairs at home. The patient will be out of town next week so we will update her HEP prior to her departure. Goals:   Short-Term Goals:  1. The patient will improve quad strength and single-leg stance stability to demonstrate non-antalgic gait pattern with walking medium-length distances for household ambulation. Timeframe: 2-3 weeks. 2. The patient will improve LE strength and endurance to facilitate intermittent standing for extended periods of time for 2-3 hours daily for household and community ambulation. Timeframe: 2-3 weeks. Long-Term Goals:  1. The patient will improve LE strength and endurance to facilitate walking distances of 1-2 mile(s) daily for community integration. Timeframe: 4 weeks. 2. Patient will improve lower motor control to perform double-leg squat with symmetrical loading, without asymmetries, and with proper posterior chain loading to facilitate squatting and lifting ADL's. Timeframe: 4 weeks. Patient will improve LE mobility, strength, and single-leg stabilization to meet strength requirements for reciprocal stair navigation pattern with no asymmetries for ascending and descending at least 3 flights of stairs daily for household and community ambulation. Timeframe: 6 weeks    Plan: Update HEP prior to patient leaving out of town. HEP: Patient was issued a HEP handout 2/9/2022 including quad set, SAQ, DL bridging, and S/L clamshell.        Charges: MT x 1, Therex x 1, NMR x 1         Total Timed Treatment: 45min    Total Treatment Time: 45min

## 2022-02-16 ENCOUNTER — OFFICE VISIT (OUTPATIENT)
Dept: PHYSICAL THERAPY | Age: 57
End: 2022-02-16
Attending: ORTHOPAEDIC SURGERY
Payer: COMMERCIAL

## 2022-02-16 PROCEDURE — 97110 THERAPEUTIC EXERCISES: CPT

## 2022-02-16 PROCEDURE — 97140 MANUAL THERAPY 1/> REGIONS: CPT

## 2022-02-16 PROCEDURE — 97112 NEUROMUSCULAR REEDUCATION: CPT

## 2022-02-16 NOTE — PROGRESS NOTES
Date of Service: 2/16/2022  Dx: Dislocation of left patella, subsequent encounter (S83.005D)                Insurance: [address] [place] [address]  Insurance Limits: 60 visit limit  Visit #: 4  Authorized # of Visits: N/A  POC/Auth Expiration: N/A  Date of Last PN: 2/9/22 (Visit #1)  Authorizing Physician/Provider: Donta Urbano MD visit: N/A  Fall Risk: Standard         Precautions: None        Subjective: \"I'm hanging in there. \" The patient denies any issues after her last appt. She reports that she wore a brace when she was doing yardwork this morning. She noticed that when she was walking the dog, she was having some random pains. Pain/Symptom Presentation: Patient denies any changes in pain ratings 2/14/22.      Pain at rest: 0/10  Pain at worst: 4-5/10    Objective:   ROM:   Knee Motion PROM AROM    Right Left Right Left   Knee Extension N/A N/A 0 0   Knee Flexion 140 125* N/A N/a   *indicates activity was associated with pain    Strength/MMT:   Knee Motion Strength    Right Left   Knee Extension 5/5 3-/5   Knee Flexion 4+/5 3-/5   *indicates activity was associated with pain    Hip Motion Strength    Right Left   Hip Flexion 5/5 4/5   Hip Extension 4/5 4-/5   Hip ABD 4/5 4-/5   Hip ER 5/5 4/5   Hip IR  5/5 4/5   *indicates activity was associated with pain    Treatment:    Therapeutic Exercise: x 25min  DL bridging: x 20   S/L clamshell: 2 x 10 (B), red theraband  S/L hip ABD: 2 x 10 (B)   Standing hip ABD and ext: 2 x 10 (B)   Shuttle DLP: x 20, 2 cords  Shuttle SLP x 20 (L) 1 cord  DLHR x 20   HEP update: Reviewed new HEP handout with new exercises and progressions, provided verbal and written instructions/cueing for proper technique and common errors/compensations as needed    Neuromuscular Re-education: x 15min  Quad set: 2 x 10 x 5\" (L) with towel roll behind knee (for quad activation)  SLR: 2 x 10 (L) (for quad activation)  LAQ: x 20 (L) (for quad activation)  TKE: 2 x 10 (L), red theraband  Tandem stance balance x 20\" (B)     Manual Therapy: x 10min  Soft tissue mobilization to left knee: quads, adductors, ITB/VL, posterior knee    Provider Interactions With Patient:   Provided patient with a written copy of exercise instruction which was also documented in patient's electronic medical chart. Assessment: Fab Murrieta shows improved quad strength, progressing SLR and LAQ exercises which she was unable to do previously. The patient will be out of town for the next week so we updated her HEP to reflect the most recent progressions in her exercises for her to continue with while she is gone. The patient would benefit from physical therapy to facilitate further progression in strength and stability. Goals:   Short-Term Goals:  1. The patient will improve quad strength and single-leg stance stability to demonstrate non-antalgic gait pattern with walking medium-length distances for household ambulation. Timeframe: 2-3 weeks. 2. The patient will improve LE strength and endurance to facilitate intermittent standing for extended periods of time for 2-3 hours daily for household and community ambulation. Timeframe: 2-3 weeks. Long-Term Goals:  1. The patient will improve LE strength and endurance to facilitate walking distances of 1-2 mile(s) daily for community integration. Timeframe: 4 weeks. 2. Patient will improve lower motor control to perform double-leg squat with symmetrical loading, without asymmetries, and with proper posterior chain loading to facilitate squatting and lifting ADL's. Timeframe: 4 weeks. Patient will improve LE mobility, strength, and single-leg stabilization to meet strength requirements for reciprocal stair navigation pattern with no asymmetries for ascending and descending at least 3 flights of stairs daily for household and community ambulation. Timeframe: 6 weeks    Plan: Follow-up with patient upon return from vacation.      HEP: Patient was issued a HEP handout 2/16/22 including quad set, SLR, LAQ, DL bridging, S/L clamshell, S/L hip ABD, standing hip ABD and ext on left side only, DLHR, and tandem stance balance.        Charges: MT x 1, Therex x 1, NMR x 1         Total Timed Treatment: 50min    Total Treatment Time: 50min

## 2022-02-16 NOTE — PATIENT INSTRUCTIONS
Patient was issued a HEP handout from [email or web address]. Exercise selection, recommended resistance, and proper form/technique were reviewed with the patient. Patient verbalized understanding/comprehension of instruction and recommendations. View at [email or web address] using code: Lois Monique

## 2022-03-02 ENCOUNTER — OFFICE VISIT (OUTPATIENT)
Dept: PHYSICAL THERAPY | Age: 57
End: 2022-03-02
Attending: ORTHOPAEDIC SURGERY
Payer: COMMERCIAL

## 2022-03-02 PROCEDURE — 97110 THERAPEUTIC EXERCISES: CPT

## 2022-03-02 PROCEDURE — 97112 NEUROMUSCULAR REEDUCATION: CPT

## 2022-03-02 PROCEDURE — 97140 MANUAL THERAPY 1/> REGIONS: CPT

## 2022-03-02 NOTE — PROGRESS NOTES
Date of Service: 3/2/2022  Dx: Dislocation of left patella, subsequent encounter (S83.005D)                Insurance: [address] [place] [address]  Insurance Limits: 60 visit limit  Visit #: 5  Authorized # of Visits: N/A  POC/Auth Expiration: N/A  Date of Last PN: 2/9/22 (Visit #1)  Authorizing Physician/Provider: Nikita Urbano MD visit: N/A  Fall Risk: Standard         Precautions: None        Subjective: \"I think I overdid on Friday and I walked 7.5 miles. We ended up walking about 2 hours. I purposely quite taking the anti-inflammatory because I didn't see that it was helping. I am periodically taking Advil. If I stand for a long time, like if I'm standing in the kitchen cooking. \"    Pain/Symptom Presentation: Patient denies any changes in pain ratings 2/14/22.      Pain at rest: 0/10  Pain at worst: 4-5/10    Objective:   ROM:   Knee Motion PROM AROM    Right Left Right Left   Knee Extension N/A N/A 0 0   Knee Flexion 140 125* N/A N/a   *indicates activity was associated with pain    Strength/MMT:   Knee Motion Strength    Right Left   Knee Extension 5/5 3-/5   Knee Flexion 4+/5 3-/5   *indicates activity was associated with pain    Hip Motion Strength    Right Left   Hip Flexion 5/5 4/5   Hip Extension 4/5 4-/5   Hip ABD 4/5 4-/5   Hip ER 5/5 4/5   Hip IR  5/5 4/5   *indicates activity was associated with pain    Treatment:    Therapeutic Exercise: x 20min  DL bridging: x 20   S/L clamshell: 2 x 10 (B), red theraband  S/L hip ABD: 2 x 10 (B), 2# ankle weight  Standing hip ABD and ext: 2 x 10 (B)   Shuttle DLP: x 20, 2 cords  Shuttle SLP x 20 (L) 1 cord  Elastic band lateral walk: 1 lap x 15ft, yellow theraband  Elastic band monster walk: 1 lap x 15ft, yellow theraband    Neuromuscular Re-education: x 10min  Quad set: 2 x 10 x 5\" (L) with towel roll behind knee (for quad activation)  SLR: 2 x 10 (L) (for quad activation), 2# ankle weight  TKE: 2 x 10 (L), red theraband  Tandem stance balance: 2 x 30\" (B)     Manual Therapy: x 15min  Soft tissue mobilization to left knee: quads, adductors, ITB/VL, posterior knee    Provider Interactions With Patient:   Progressed activity/exercise intensity following assessment of program, performance, and tolerance. Advised patient schedule additional PT appts. Assessment: Gary Caldwell arrives today after a personal vacation. She reports that she did have some increased swelling in her knee after walking a long distance with her friend. But, she was able to tolerate walking on the beach with uneven surfaces with good tolerance. She does report some remaining knee soreness with prolonged standing. We progressed the patient's exercises some this date which she tolerated well. We will continue to progress patient as tolerated. Goals:   Short-Term Goals:  1. The patient will improve quad strength and single-leg stance stability to demonstrate non-antalgic gait pattern with walking medium-length distances for household ambulation. Timeframe: 2-3 weeks. 2. The patient will improve LE strength and endurance to facilitate intermittent standing for extended periods of time for 2-3 hours daily for household and community ambulation. Timeframe: 2-3 weeks. Long-Term Goals:  1. The patient will improve LE strength and endurance to facilitate walking distances of 1-2 mile(s) daily for community integration. Timeframe: 4 weeks. 2. Patient will improve lower motor control to perform double-leg squat with symmetrical loading, without asymmetries, and with proper posterior chain loading to facilitate squatting and lifting ADL's. Timeframe: 4 weeks. 3. Patient will improve LE mobility, strength, and single-leg stabilization to meet strength requirements for reciprocal stair navigation pattern with no asymmetries for ascending and descending at least 3 flights of stairs daily for household and community ambulation. Timeframe: 6 weeks    Plan: Follow-up with patient upon return from vacation.      HEP: Patient was issued a HEP handout 2/16/22 including quad set, SLR, LAQ, DL bridging, S/L clamshell, S/L hip ABD, standing hip ABD and ext on left side only, DLHR, and tandem stance balance.        Charges: MT x 1, Therex x 1, NMR x 1         Total Timed Treatment: 45min    Total Treatment Time: 45min

## 2022-03-04 ENCOUNTER — OFFICE VISIT (OUTPATIENT)
Dept: PHYSICAL THERAPY | Age: 57
End: 2022-03-04
Attending: ORTHOPAEDIC SURGERY
Payer: COMMERCIAL

## 2022-03-04 PROCEDURE — 97112 NEUROMUSCULAR REEDUCATION: CPT

## 2022-03-04 PROCEDURE — 97140 MANUAL THERAPY 1/> REGIONS: CPT

## 2022-03-04 PROCEDURE — 97110 THERAPEUTIC EXERCISES: CPT

## 2022-03-04 NOTE — PROGRESS NOTES
Date of Service: 3/4/2022  Dx: Dislocation of left patella, subsequent encounter (S83.005D)                Insurance: [address] [place] [address]  Insurance Limits: 60 visit limit  Visit #: 6  Authorized # of Visits: N/A  POC/Auth Expiration: N/A  Date of Last PN: 2/9/22 (Visit #1)  Authorizing Physician/Provider: Melany Urbano MD visit: N/A  Fall Risk: Standard         Precautions: None        Subjective: \"Every morning, it's a little creaky and then by mid-day, it's fine. I try to get it warmed up. \" The patient reports no issues after her last appt. \"I just felt like I had a workout. \"     Pain/Symptom Presentation:      Pain at rest: 0/10  Pain at worst: 4-5/10    Objective:   ROM:   Knee Motion PROM AROM    Right Left Right Left   Knee Extension N/A N/A 0 0   Knee Flexion 140 125* N/A N/a   *indicates activity was associated with pain    Strength/MMT:   Knee Motion Strength    Right Left   Knee Extension 5/5 3-/5   Knee Flexion 4+/5 3-/5   *indicates activity was associated with pain    Hip Motion Strength    Right Left   Hip Flexion 5/5 4/5   Hip Extension 4/5 4-/5   Hip ABD 4/5 4-/5   Hip ER 5/5 4/5   Hip IR  5/5 4/5   *indicates activity was associated with pain    Treatment:  Therapeutic Activities: x 6min  Shuttle DLP: x 20, 3 cords  Shuttle SLP x 20 (L) 2 cords  Step-up: 2 x 10 (B), 6\" step     Therapeutic Exercise: x 19min  DL bridging: x 20   S/L clamshell: 2 x 10 (B), red theraband  S/L hip ABD: 2 x 10 (B), 2# ankle weight  Elastic band lateral walk: 1 lap x 15ft, yellow theraband  Elastic band monster walk: 1 lap x 15ft, yellow theraband  HEP update: Reviewed new HEP handout with new exercises and progressions, provided verbal and written instructions/cueing for proper technique and common errors/compensations as needed    Neuromuscular Re-education: x 10min  Quad set: 2 x 10 x 5\" (L) with towel roll behind knee (for quad activation)  SLR: 2 x 10 (L) (for quad activation), 2# ankle weight  TKE: 2 x 10 (L), red theraband  SLB: 2 x 30\" (B)     Manual Therapy: x 10min  Soft tissue mobilization to left knee: quads, adductors, ITB/VL, posterior knee    Provider Interactions With Patient:   Provided patient with a written copy of exercise instruction which was also documented in patient's electronic medical chart. Assessment: Wai Gomez denies any soreness after her last session with additional exercise progressions. The patient was issued an updated HEP handout to facilitate additional strength progression at home. We continue to make small progression in exercise resistance and/or difficulty in therapy with good tolerance. We tried stair navigation activities leading with her involved leg which she was able to complete, but with some hesitation. We will continue to progress LE strength and dynamic knee stability for full return to [facility]. Goals:   Short-Term Goals:  1. The patient will improve quad strength and single-leg stance stability to demonstrate non-antalgic gait pattern with walking medium-length distances for household ambulation. Timeframe: 2-3 weeks. 2. The patient will improve LE strength and endurance to facilitate intermittent standing for extended periods of time for 2-3 hours daily for household and community ambulation. Timeframe: 2-3 weeks. Long-Term Goals:  1. The patient will improve LE strength and endurance to facilitate walking distances of 1-2 mile(s) daily for community integration. Timeframe: 4 weeks. 2. Patient will improve lower motor control to perform double-leg squat with symmetrical loading, without asymmetries, and with proper posterior chain loading to facilitate squatting and lifting ADL's. Timeframe: 4 weeks. 3. Patient will improve LE mobility, strength, and single-leg stabilization to meet strength requirements for reciprocal stair navigation pattern with no asymmetries for ascending and descending at least 3 flights of stairs daily for household and community ambulation.  Timeframe: 6 weeks    Plan: Follow-up on tolerance to updated HEP. HEP: Patient was issued a HEP handout 2/16/22 including quad set, SLR, LAQ, DL bridging, S/L clamshell, S/L hip ABD, standing hip ABD and ext on left side only, DLHR, and tandem stance balance.        Charges: MT x 1, Therex x 1, NMR x 1         Total Timed Treatment: 45min    Total Treatment Time: 45min

## 2022-03-04 NOTE — PATIENT INSTRUCTIONS
Patient was issued a HEP handout from [email or web address]. Exercise selection, recommended resistance, and proper form/technique were reviewed with the patient. Patient verbalized understanding/comprehension of instruction and recommendations. View at [email or web address] using code: [email or web address]

## 2022-03-08 ENCOUNTER — TELEPHONE (OUTPATIENT)
Dept: PHYSICAL THERAPY | Facility: HOSPITAL | Age: 57
End: 2022-03-08

## 2022-03-09 ENCOUNTER — OFFICE VISIT (OUTPATIENT)
Dept: PHYSICAL THERAPY | Age: 57
End: 2022-03-09
Attending: ORTHOPAEDIC SURGERY
Payer: COMMERCIAL

## 2022-03-09 PROCEDURE — 97112 NEUROMUSCULAR REEDUCATION: CPT

## 2022-03-09 PROCEDURE — 97530 THERAPEUTIC ACTIVITIES: CPT

## 2022-03-09 PROCEDURE — 97110 THERAPEUTIC EXERCISES: CPT

## 2022-03-09 NOTE — PROGRESS NOTES
Date of Service: 3/9/2022  Dx: Dislocation of left patella, subsequent encounter (S83.005D)                Insurance: [address] [place] [address]  Insurance Limits: 60 visit limit  Visit #: 7  Authorized # of Visits: N/A  POC/Auth Expiration: N/A  Date of Last PN: 2/9/22 (Visit #1)  Authorizing Physician/Provider: Liliana Urbano MD visit: N/A  Fall Risk: Standard         Precautions: None        Subjective: \"I'm about the same. I don't know why, but every once in a while I still feel the need to take Advil. \"     Pain/Symptom Presentation: Patient denies any changes in pain medication.     Pain at rest: 0/10  Pain at worst: 4-5/10    Objective:   ROM:   Knee Motion PROM AROM    Right Left Right Left   Knee Extension N/A N/A 0 0   Knee Flexion 140 125* N/A N/a   *indicates activity was associated with pain    Strength/MMT:   Knee Motion Strength    Right Left   Knee Extension 5/5 3-/5   Knee Flexion 4+/5 3-/5   *indicates activity was associated with pain    Hip Motion Strength    Right Left   Hip Flexion 5/5 4/5   Hip Extension 4/5 4-/5   Hip ABD 4/5 4-/5   Hip ER 5/5 4/5   Hip IR  5/5 4/5   *indicates activity was associated with pain    Treatment:  Therapeutic Activities: x 10min  BW squat: 2 x 8   Step-up: 2 x 10 (B), 6\" step   Step-down: 1 x 10 (B), 6\" step, mild deviations  Split squat: 1 x 10 (B)     Therapeutic Exercise: x 15min  SLR: 2 x 10 (L) (for quad activation), 2# ankle weight  Bridge marching: x 10  S/L hip ABD: 2 x 10 (B), 2# ankle weight  Elastic band lateral walk: 1 lap x 15ft, yellow theraband  Elastic band monster walk: 1 lap x 15ft, yellow theraband    Neuromuscular Re-education: x 10min  Quad set: 2 x 10 x 5\" (L) with towel roll behind knee (for quad activation)  TKE: 2 x 10 (L), red theraband (for quad activation and knee stability)  SLB on airex: 2 x 30\" (B) (for balance/stability)    Manual Therapy: x 10min  Soft tissue mobilization to left knee: quads, adductors, ITB/VL, posterior knee    Provider Interactions With Patient:   Progressed activity/exercise intensity following assessment of program, performance, and tolerance. Assessment: Jordy Early is still with some increased generalized pain that requires occasional need for OTC pain medication. Despite this, the patient was able to complete all exercises and some progressions this session. We added lunging and descending stairs, which the patient was pleased with her tolerance to. She was encourage to try navigating down the stairs as much as she can on her own at home. We will continue therapy to facilitate further progression in strength and dynamic knee stability. Goals:   Short-Term Goals:  1. The patient will improve quad strength and single-leg stance stability to demonstrate non-antalgic gait pattern with walking medium-length distances for household ambulation. Timeframe: 2-3 weeks. 2. The patient will improve LE strength and endurance to facilitate intermittent standing for extended periods of time for 2-3 hours daily for household and community ambulation. Timeframe: 2-3 weeks. Long-Term Goals:  1. The patient will improve LE strength and endurance to facilitate walking distances of 1-2 mile(s) daily for community integration. Timeframe: 4 weeks. 2. Patient will improve lower motor control to perform double-leg squat with symmetrical loading, without asymmetries, and with proper posterior chain loading to facilitate squatting and lifting ADL's. Timeframe: 4 weeks. 3. Patient will improve LE mobility, strength, and single-leg stabilization to meet strength requirements for reciprocal stair navigation pattern with no asymmetries for ascending and descending at least 3 flights of stairs daily for household and community ambulation. Timeframe: 6 weeks    Plan: Progress summary next session.       HEP: Patient was issued a HEP handout 2/16/22 including quad set, SLR, LAQ, DL bridging, S/L clamshell, S/L hip ABD, standing hip ABD and ext on left side only, DLHR, and tandem stance balance. Charges:  Ther Act, Therex x 1, NMR x 1         Total Timed Treatment: 45min    Total Treatment Time: 45min

## 2022-03-11 ENCOUNTER — OFFICE VISIT (OUTPATIENT)
Dept: PHYSICAL THERAPY | Age: 57
End: 2022-03-11
Attending: ORTHOPAEDIC SURGERY
Payer: COMMERCIAL

## 2022-03-11 PROCEDURE — 97110 THERAPEUTIC EXERCISES: CPT

## 2022-03-11 PROCEDURE — 97530 THERAPEUTIC ACTIVITIES: CPT

## 2022-03-11 NOTE — PROGRESS NOTES
Date of Service: 3/11/2022  Dx: Dislocation of left patella, subsequent encounter (S83.005D)                Insurance: [address] [place] [address]  Insurance Limits: 60 visit limit  Visit #: 8  Authorized # of Visits: N/A  POC/Auth Expiration: N/A  Date of Last PN: 2/9/22 (Visit #1)  Authorizing Physician/Provider: Randall Urbano MD visit: N/A  Fall Risk: Standard         Precautions: None        Subjective: \"It was little sore this morning, but it's okay today. \"    Pain/Symptom Presentation: Patient denies any changes in pain medication. Pain at rest: 0/10  Pain at worst: 4-5/10    Objective:   ROM:   Knee Motion PROM AROM    Right Left Right Left   Knee Extension N/A N/A 0 0   Knee Flexion 140 125* N/A N/a   *indicates activity was associated with pain    Strength/MMT:   Knee Motion Strength    Right Left   Knee Extension 5/5 3-/5   Knee Flexion 4+/5 3-/5   *indicates activity was associated with pain    Hip Motion Strength    Right Left   Hip Flexion 5/5 4/5   Hip Extension 4/5 4-/5   Hip ABD 4/5 4-/5   Hip ER 5/5 4/5   Hip IR  5/5 4/5   *indicates activity was associated with pain    Treatment:  Therapeutic Activities: x 10min  BW squat: 2 x 8   Step-up: 2 x 10 (B), 6\" step   Step-down: 1 x 10 (B), 6\" step, mild deviations  Split squat: 2 x 8 (B)     Therapeutic Exercise: x 15min  SLR: 2 x 10 (L) (for quad activation), 2# ankle weight  S/L hip ABD: 2 x 10 (B), 2# ankle weight  Bridge marching: x 10  Elastic band lateral walk: 1 lap x 15ft, red theraband  Elastic band monster walk: 1 lap x 15ft, red theraband    Neuromuscular Re-education: x 5min  SLB on airex: 2 x 30\" (B) (for balance/stability)    Manual Therapy: x 10min  Soft tissue mobilization to left knee: quads, adductors, ITB/VL, posterior knee    Provider Interactions With Patient:   Verbal and manual cueing on proper performance of the prescribed exercises. Assessment: Terence Linn continues to progress her LE strengthening exercises in therapy.  We continue to work on general LE strength and dynamic knee stabilization. The patient is trying to push her stair navigation activities at home to help build her confidence in her knee. The patient would benefit from continued physical therapy to facilitate further progression in strength and stability at her knee for full return to ADL's and PLF. Goals:   Short-Term Goals:  1. The patient will improve quad strength and single-leg stance stability to demonstrate non-antalgic gait pattern with walking medium-length distances for household ambulation. Timeframe: 2-3 weeks. 2. The patient will improve LE strength and endurance to facilitate intermittent standing for extended periods of time for 2-3 hours daily for household and community ambulation. Timeframe: 2-3 weeks. Long-Term Goals:  1. The patient will improve LE strength and endurance to facilitate walking distances of 1-2 mile(s) daily for community integration. Timeframe: 4 weeks. 2. Patient will improve lower motor control to perform double-leg squat with symmetrical loading, without asymmetries, and with proper posterior chain loading to facilitate squatting and lifting ADL's. Timeframe: 4 weeks. 3. Patient will improve LE mobility, strength, and single-leg stabilization to meet strength requirements for reciprocal stair navigation pattern with no asymmetries for ascending and descending at least 3 flights of stairs daily for household and community ambulation. Timeframe: 6 weeks    Plan: Progress summary next session. HEP: Patient was issued a HEP handout 2/16/22 including quad set, SLR, LAQ, DL bridging, S/L clamshell, S/L hip ABD, standing hip ABD and ext on left side only, DLHR, and tandem stance balance. Charges:  Ther Act, Therex x 2        Total Timed Treatment: 40min    Total Treatment Time: 40min

## 2022-03-15 ENCOUNTER — OFFICE VISIT (OUTPATIENT)
Dept: PHYSICAL THERAPY | Age: 57
End: 2022-03-15
Attending: ORTHOPAEDIC SURGERY
Payer: COMMERCIAL

## 2022-03-15 PROCEDURE — 97140 MANUAL THERAPY 1/> REGIONS: CPT

## 2022-03-15 PROCEDURE — 97530 THERAPEUTIC ACTIVITIES: CPT

## 2022-03-15 PROCEDURE — 97110 THERAPEUTIC EXERCISES: CPT

## 2022-03-15 NOTE — PROGRESS NOTES
PHYSICAL THERAPY RE-EVALUATION:     Date of Service: 3/11/2022  Dx: Dislocation of left patella, subsequent encounter (S83.005D)                Insurance: [address] [place] [address]  Insurance Limits: 60 visit limit  Visit #: 9  Authorized # of Visits: N/A  POC/Auth Expiration: N/A  Date of Last PN: 2/9/22 (Visit #1)  Authorizing Physician/Provider: Anna Urbano MD visit: N/A  Fall Risk: Standard         Precautions: None        Subjective: The patient reports no problem with her HEP. \"I'm still working on going up and down the stairs more than normal. I did just come from a longer than usual walk with Milad Chaidez and the knee is still sore. \" The patient reports that her dance teacher contacted her and inquired about when she was going to come back and she doesn't feel that she'll be ready to return for this upcoming session. The patient reports that she feels she's 75% recovered. \"I still watch the amount of weight I put on that leg when I'm climbing stairs. I give myself a rest if I'm standing a long time. \" The patient \"would like to feel confident that if I stand on the this knee, that it's going to hold. It still drives me crazy that I can't straighten out the knee. When I walk, that's what I feel the most.\"     Pain/Symptom Presentation: Patient reports localized pain over the medial knee. Pain at rest: 0/10  Pain at worst: 4-5/10    Objective: Activity Measures:  Sleeping: No Activity Limitation: Patient is able to sleep through the night without interruption due to knee pain. Sitting: No Activity Limitation: Patient is able to sit > 4 hours without limitations. Standing: Mild Activity Limitation: Patient is able to stand about 30-45 minutes before disruption due to symptoms. Walking: Moderate Activity Limitation: Patient is able to walk up to 1.5 mile before disruption. Driving: No Activity Limitation: Patient is able to drive without limitations.    Squatting: Mild Activity Limitation: Patient is able to squat up to 60deg before disruption due to symptoms. Ascending Stairs: Mild Activity Limitation: Patient navigates up the stairs is only able to ascend stairs with step-to gait pattern, mild deviations. Descending Stairs: Mild Activity Limitation: Patient navigates down the stairs with step-over gait pattern, mild deviations. Stairs: Mild Activity Limitation: Patient is able to navigate 3 flights of stairs with step-over gait pattern, mild deviations. Gait: Patient demonstrates mildly antalgic gait upon entering the clinic. Palpation: Patient demonstrates localized swelling in the left posterior knee   Sensation: Patient report intact sensation. ROM:   Knee Motion PROM AROM    Right Left Right Left   Knee Extension N/A N/A +5 0   Knee Flexion 140 132* N/A N/A   *indicates activity was associated with pain    Strength/MMT:   Knee Motion Strength    Right Left   Knee Extension 5/5 4-/5*   Knee Flexion 4+/5 4-/5   *indicates activity was associated with pain    Hip Motion Strength    Right Left   Hip Flexion 5/5 4/5   Hip Extension 4/5 4-/5   Hip ABD 4/5 4-/5   Hip ER 5/5 4/5   Hip IR  5/5 4/5   *indicates activity was associated with pain    Treatment:  Therapeutic Activities: x 12min  BW squat: 2 x 8, 10# KB  Step-up: 2 x 10 (B), 8\" step   Step-down: 1 x 10 (B), 8\" step  Split squat: 2 x 8 (B)     Therapeutic Exercise: x 20min  Knee extension stretch: x 5 (R)   Knee flexion PROM: x 5 (R)   SLR: 2 x 10 (L) (for quad activation), 2# ankle weight  S/L hip ABD: 2 x 10 (B), 2# ankle weight  Bridge marching: 2 x 8 (B)  Elastic band lateral walk: 1 lap x 15ft, red theraband  Elastic band monster walk: 1 lap x 15ft, red theraband    Manual Therapy: x 10min  Soft tissue mobilization to left knee: quads, adductors, ITB/VL, posterior knee    Provider Interactions With Patient:   Verbal and manual cueing on proper performance of the prescribed exercises.    Discussed the patient's remaining symptoms, functional limitations, and concerns to establish updated POC. Assessment: Marie Barron is a 55year old female that presents to physical therapy evaluation s/p (L) knee injury sustained 1/6/22 when she was carrying a load of laundry up the stairs and her knee buckled on her, collapsing medially. MRI findings show an posterior horn medial meniscus tear, anterior lateral meniscal tear, and ACL sprain. Marie Barron has been seen for 9 sessions in physical therapy with improvements in pain management as well as function. The patient is with some mild end-range knee flexion pain, but has improved her knee mobility. She still demonstrates a mildly antalgic gait pattern but has improved her tolerance to standing, walking, squatting, and stairs. She still reports medial knee pain which may be due to remaining bone bruising and possible MCL injury. There is still palpable localized posterior knee swelling. The patient would like to see her strength and endurance further improve for walking long distances for walking her dog as well as navigating stairs. The patient would also like to resume recreational ballet participation. The patient would benefit from physical therapy to facilitate normalized knee pain management, swelling management, ROM, strength, endurance, and stability for full return to ADL's and PLF. Goals:   Short-Term Goals:  1. The patient will improve quad strength and single-leg stance stability to demonstrate non-antalgic gait pattern with walking medium-length distances for household ambulation. Timeframe: 6 weeks. (IN PROGRESS 3/15/22. Patient still demonstrates mildly antalgic gait pattern. Updated goal timeframe. 2. The patient will improve LE strength and endurance to facilitate intermittent standing for extended periods of time for 2-3 hours daily for household and community ambulation. Timeframe: 6 weeks. (IN PROGRESS 3/15/22. Patient is able to stand 30-45 minutes before disruption due to symptoms.  Updated goal timeframe.)   Long-Term Goals:  1. The patient will improve LE strength and endurance to facilitate walking distances of 1-2 mile(s) daily for community integration. Timeframe: 4 weeks. (MET 3/15/22.)  2. Patient will improve lower motor control to perform double-leg squat with symmetrical loading, without asymmetries, and with proper posterior chain loading to facilitate squatting and lifting ADL's. Timeframe: 8 weeks. (IN PROGRESS 3/15/22. Patient is able to to squat to 60deg but shows mild weight shift squatting into deeper ranges. Updated goal timeframe.)  3. Patient will improve LE mobility, strength, and single-leg stabilization to meet strength requirements for reciprocal stair navigation pattern with no asymmetries for ascending and descending at least 3 flights of stairs daily for household and community ambulation. Timeframe: 8-10 weeks (IN PROGRESS. Patient is able to navigate 3 flights of stairs but is with remaining deviations for descending stairs. Updated goal timeframe.)   4. Patient will improve LE strength and dynamic knee stability to facilitate return to Namely participation for general fitness activities. Plan: Patient will be seen for 2x/week for an additional 4 weeks, for a total of 20 visits, over a 90 day period. We will re-evaluate the patient at that time in order to determine functional progress, evaluate short-term goal completion, and establish an updated plan of care. Possible treatment interventions will/may include: Therapeutic Activities, Therapeutic Exercise, Neuromuscular Re-education, Manual Therapy, Home Exercise Program Instruction, Patient Education, Self-Care/Home Management, Gait Training, and Modalities as needed. HEP: Patient was issued a HEP handout 3/15/22 including bridge marching, band lateral and monster walk, step-up, step-down, and split squats. Charges:  Ther Act x 1, Therex x 1, MT x 1     Total Timed Treatment: 42min    Total Treatment Time: 42min

## 2022-03-17 ENCOUNTER — TELEPHONE (OUTPATIENT)
Dept: INTERNAL MEDICINE CLINIC | Facility: CLINIC | Age: 57
End: 2022-03-17

## 2022-03-18 ENCOUNTER — APPOINTMENT (OUTPATIENT)
Dept: PHYSICAL THERAPY | Age: 57
End: 2022-03-18
Attending: ORTHOPAEDIC SURGERY
Payer: COMMERCIAL

## 2022-03-18 ENCOUNTER — APPOINTMENT (OUTPATIENT)
Dept: PHYSICAL THERAPY | Age: 57
End: 2022-03-18
Attending: INTERNAL MEDICINE
Payer: COMMERCIAL

## 2022-03-22 ENCOUNTER — APPOINTMENT (OUTPATIENT)
Dept: PHYSICAL THERAPY | Age: 57
End: 2022-03-22
Attending: ORTHOPAEDIC SURGERY
Payer: COMMERCIAL

## 2022-03-23 ENCOUNTER — OFFICE VISIT (OUTPATIENT)
Dept: PHYSICAL THERAPY | Age: 57
End: 2022-03-23
Attending: ORTHOPAEDIC SURGERY
Payer: COMMERCIAL

## 2022-03-23 PROCEDURE — 97110 THERAPEUTIC EXERCISES: CPT

## 2022-03-23 PROCEDURE — 97530 THERAPEUTIC ACTIVITIES: CPT

## 2022-03-23 PROCEDURE — 97140 MANUAL THERAPY 1/> REGIONS: CPT

## 2022-03-23 NOTE — PROGRESS NOTES
Date of Service: 3/23/2022  Dx: Dislocation of left patella, subsequent encounter (S83.005D)                Insurance: [address] [place] [address]  Insurance Limits: 60 visit limit  Visit #: 10  Authorized # of Visits: N/A  POC/Auth Expiration: N/A  Date of Last PN: 3/15/22 (Visit #9)  Authorizing Physician/Provider: Priyanka Urbano MD visit: N/A  Fall Risk: Standard         Precautions: None        Subjective: \"I've been really pleased. I went 2.5 miles with Carlito Adama the day before yesterday and yesterday I did 1.5 miles. I've been experimenting with the walking. My only frustration is that I still can't straighten the knee and I can feel that when I walk. The other thing I've noticed is that at night, it's a deeper throb, and I hadn't really noticed that recently. It feels like I have a permanent elastic band around my knee. \"     Pain/Symptom Presentation: Patient reports localized pain over the medial knee.    Pain at rest: 0/10  Pain at worst: 3-4/10    Objective:     ROM:   Knee Motion PROM AROM    Right Left Right Left   Knee Extension N/A N/A +5 0   Knee Flexion 140 132* N/A N/A   *indicates activity was associated with pain    Strength/MMT:   Knee Motion Strength    Right Left   Knee Extension 5/5 4-/5*   Knee Flexion 4+/5 4-/5   *indicates activity was associated with pain    Hip Motion Strength    Right Left   Hip Flexion 5/5 4/5   Hip Extension 4/5 4-/5   Hip ABD 4/5 4-/5   Hip ER 5/5 4/5   Hip IR  5/5 4/5   *indicates activity was associated with pain    Treatment:  Therapeutic Activities: x 15min  BW squat: 2 x 10, 10# KB  Step-up: 2 x 8 (B), 8\" step, 10# KB  Step-down: 1 x 10 (B), 8\" step  Split squat: 2 x 8 (B)  Lateral lunge: 1 x 8 (B)      Therapeutic Exercise: x 20min  Knee extension stretch: x 5 (R)   Knee flexion PROM: x 5 (R)   SLR: 2 x 10 (L), 3# ankle weight  Bridge marching: 2 x 8 (B)  Elastic band lateral walk: 1 lap x 15ft, red theraband  Elastic band monster walk: 1 lap x 15ft, red theraband    Manual Therapy: x 10min  Soft tissue mobilization to left knee: quads, adductors, ITB/VL, posterior knee    Provider Interactions With Patient:   Verbal and manual cueing on proper performance of the prescribed exercises. Assessment: David Hsu continues to complain of stiffness in her knee into extension. The patient does have hyperextension on her right side which is probably the reason for the subjective sensation of remaining stiffness at her left knee, since she does demonstrate full knee extension upon assessment. We worked additional hyperextension mobility since the patient feels that she is still walking with a limp due to her remaining stiffness complaints. The patient expresses concern about getting back to ballet and handling jumping activities which she was assured she should be able to return to, but we need to further progress her strength and dynamic knee stability. We will continue to progress LE strengthening for full return to PLF including ballet participation. Goals:   Short-Term Goals:  1. The patient will improve quad strength and single-leg stance stability to demonstrate non-antalgic gait pattern with walking medium-length distances for household ambulation. Timeframe: 6 weeks. (IN PROGRESS 3/15/22. Patient still demonstrates mildly antalgic gait pattern. Updated goal timeframe. 2. The patient will improve LE strength and endurance to facilitate intermittent standing for extended periods of time for 2-3 hours daily for household and community ambulation. Timeframe: 6 weeks. (IN PROGRESS 3/15/22. Patient is able to stand 30-45 minutes before disruption due to symptoms. Updated goal timeframe.)   Long-Term Goals:  1. The patient will improve LE strength and endurance to facilitate walking distances of 1-2 mile(s) daily for community integration. Timeframe: 4 weeks. (MET 3/15/22.)  2.  Patient will improve lower motor control to perform double-leg squat with symmetrical loading, without asymmetries, and with proper posterior chain loading to facilitate squatting and lifting ADL's. Timeframe: 8 weeks. (IN PROGRESS 3/15/22. Patient is able to to squat to 60deg but shows mild weight shift squatting into deeper ranges. Updated goal timeframe.)  3. Patient will improve LE mobility, strength, and single-leg stabilization to meet strength requirements for reciprocal stair navigation pattern with no asymmetries for ascending and descending at least 3 flights of stairs daily for household and community ambulation. Timeframe: 8-10 weeks (IN PROGRESS. Patient is able to navigate 3 flights of stairs but is with remaining deviations for descending stairs. Updated goal timeframe.)   4. Patient will improve LE strength and dynamic knee stability to facilitate return to ballet participation for general fitness activities. Plan:  Continue to progress LE strengthening and dynamic knee stability. HEP: Patient was issued a HEP handout 3/15/22 including bridge marching, band lateral and monster walk, step-up, step-down, and split squats. Charges:  Ther Act x 1, Therex x 1, MT x 1     Total Timed Treatment: 45min    Total Treatment Time: 45min

## 2022-03-25 ENCOUNTER — OFFICE VISIT (OUTPATIENT)
Dept: PHYSICAL THERAPY | Age: 57
End: 2022-03-25
Attending: ORTHOPAEDIC SURGERY
Payer: COMMERCIAL

## 2022-03-25 PROCEDURE — 97140 MANUAL THERAPY 1/> REGIONS: CPT

## 2022-03-25 PROCEDURE — 97110 THERAPEUTIC EXERCISES: CPT

## 2022-03-25 PROCEDURE — 97530 THERAPEUTIC ACTIVITIES: CPT

## 2022-03-25 NOTE — PROGRESS NOTES
Date of Service: 3/25/2022  Dx: Dislocation of left patella, subsequent encounter (S83.005D)                Insurance: [address] [place] [address]  Insurance Limits: 60 visit limit  Visit #: 11  Authorized # of Visits: N/A  POC/Auth Expiration: N/A  Date of Last PN: 3/15/22 (Visit #9)  Authorizing Physician/Provider: Dillon Urbano MD visit: N/A  Fall Risk: Standard         Precautions: None        Subjective: The patient reports that she had some soreness after her last PT session and noticed some soreness when walking the dog, but it was better the next day. Pain/Symptom Presentation: Patient reports localized pain over the medial knee. Pain at rest: 0/10  Pain at worst: 4-5/10    Objective:     ROM:   Knee Motion PROM AROM    Right Left Right Left   Knee Extension +8 +3 +5 0   Knee Flexion 140 132* N/A N/A   *indicates activity was associated with pain    Strength/MMT:   Knee Motion Strength    Right Left   Knee Extension 5/5 4-/5*   Knee Flexion 4+/5 4-/5   *indicates activity was associated with pain    Hip Motion Strength    Right Left   Hip Flexion 5/5 4/5   Hip Extension 4/5 4-/5   Hip ABD 4/5 4-/5   Hip ER 5/5 4/5   Hip IR  5/5 4/5   *indicates activity was associated with pain    Treatment:  Therapeutic Activities: x 15min  Goblet squat: 2 x 10, 10# KB  Step-up: 2 x 8 (B), 8\" step, 10# KB  Step-down: 1 x 10 (B), 8\" step  Split squat: 2 x 8 (B)  Lateral lunge: 1 x 8 (B)      Therapeutic Exercise: x 20min  Knee extension stretch: x 5 (R)   Knee flexion PROM: x 5 (R)   SLR: 2 x 10 (L), 3# ankle weight  Bridge marching: 2 x 8 (B)  Elastic band lateral walk: 1 lap x 15ft, red theraband  Elastic band monster walk: 1 lap x 15ft, red theraband    Manual Therapy: x 10min  Soft tissue mobilization to left knee: quads, adductors, ITB/VL, posterior knee    Provider Interactions With Patient:   Verbal and manual cueing on proper performance of the prescribed exercises.      Assessment: Caryn Dejesus shows some improvements in knee extension/hyperextension mobility this date compared to last session. We kept the same exercise regimen this date based on increased soreness complaints after her last session. The patient is still with some apprehension to step up the stairs backwards with her involved side for her step-down exercises. The patient would benefit from continued therapy for further progression in knee extension mobility and dynamic knee stability for full progression to ADL's and PLF. Goals:   Short-Term Goals:  1. The patient will improve quad strength and single-leg stance stability to demonstrate non-antalgic gait pattern with walking medium-length distances for household ambulation. Timeframe: 6 weeks. (IN PROGRESS 3/15/22. Patient still demonstrates mildly antalgic gait pattern. Updated goal timeframe. 2. The patient will improve LE strength and endurance to facilitate intermittent standing for extended periods of time for 2-3 hours daily for household and community ambulation. Timeframe: 6 weeks. (IN PROGRESS 3/15/22. Patient is able to stand 30-45 minutes before disruption due to symptoms. Updated goal timeframe.)   Long-Term Goals:  1. The patient will improve LE strength and endurance to facilitate walking distances of 1-2 mile(s) daily for community integration. Timeframe: 4 weeks. (MET 3/15/22.)  2. Patient will improve lower motor control to perform double-leg squat with symmetrical loading, without asymmetries, and with proper posterior chain loading to facilitate squatting and lifting ADL's. Timeframe: 8 weeks. (IN PROGRESS 3/15/22. Patient is able to to squat to 60deg but shows mild weight shift squatting into deeper ranges. Updated goal timeframe.)  3.  Patient will improve LE mobility, strength, and single-leg stabilization to meet strength requirements for reciprocal stair navigation pattern with no asymmetries for ascending and descending at least 3 flights of stairs daily for household and community ambulation. Timeframe: 8-10 weeks (IN PROGRESS. Patient is able to navigate 3 flights of stairs but is with remaining deviations for descending stairs. Updated goal timeframe.)   4. Patient will improve LE strength and dynamic knee stability to facilitate return to ballet participation for general fitness activities. Plan:  Continue to progress LE strengthening and dynamic knee stability. HEP: Patient was issued a HEP handout 3/15/22 including bridge marching, band lateral and monster walk, step-up, step-down, and split squats. Charges:  Ther Act x 1, Therex x 1, MT x 1     Total Timed Treatment: 45min    Total Treatment Time: 45min

## 2022-04-06 ENCOUNTER — OFFICE VISIT (OUTPATIENT)
Dept: PHYSICAL THERAPY | Age: 57
End: 2022-04-06
Attending: ORTHOPAEDIC SURGERY
Payer: COMMERCIAL

## 2022-04-06 ENCOUNTER — OFFICE VISIT (OUTPATIENT)
Dept: ORTHOPEDICS CLINIC | Facility: CLINIC | Age: 57
End: 2022-04-06
Payer: COMMERCIAL

## 2022-04-06 DIAGNOSIS — S83.232D COMPLEX TEAR OF MEDIAL MENISCUS OF LEFT KNEE AS CURRENT INJURY, SUBSEQUENT ENCOUNTER: ICD-10-CM

## 2022-04-06 DIAGNOSIS — S83.005D DISLOCATION OF LEFT PATELLA, SUBSEQUENT ENCOUNTER: Primary | ICD-10-CM

## 2022-04-06 PROCEDURE — 99214 OFFICE O/P EST MOD 30 MIN: CPT | Performed by: ORTHOPAEDIC SURGERY

## 2022-04-06 PROCEDURE — 97110 THERAPEUTIC EXERCISES: CPT

## 2022-04-06 PROCEDURE — 97140 MANUAL THERAPY 1/> REGIONS: CPT

## 2022-04-06 PROCEDURE — 97530 THERAPEUTIC ACTIVITIES: CPT

## 2022-04-06 NOTE — PROGRESS NOTES
Date of Service: 4/6/2022  Dx: Dislocation of left patella, subsequent encounter (S83.005D)                Insurance: [address] [place] [address]  Insurance Limits: 60 visit limit  Visit #: 12  Authorized # of Visits: N/A  POC/Auth Expiration: N/A  Date of Last PN: 3/15/22 (Visit #9)  Authorizing Physician/Provider: Liliana Urbano MD visit: N/A  Fall Risk: Standard         Precautions: None        Subjective: Things are good. I just left Dr. Nick Castanon office. We are going to move forward with seeing how I feel at the end of May. I did tell him that I was going to try get through PT in April and see how you feel I'm doing. I'll see how it goes in May on my own. He said that if I'm still having the issues with ROM then he might recommend a cortisone shot. After that, if it's still having issues, then there is that tear in there and he said that could just be getting in the way. \"     \"I will say that my gait is getting closer to what it was before. It's  and I still can't bend my knee all the way. \"     Pain/Symptom Presentation: Patient reports localized pain over the medial knee.    Pain at rest: 0/10  Pain at worst: 4-5/10    Objective:     ROM:   Knee Motion PROM AROM    Right Left Right Left   Knee Extension +8 +3 +5 0   Knee Flexion 140 132* N/A N/A   *indicates activity was associated with pain    Strength/MMT:   Knee Motion Strength    Right Left   Knee Extension 5/5 4-/5*   Knee Flexion 4+/5 4-/5   *indicates activity was associated with pain    Hip Motion Strength    Right Left   Hip Flexion 5/5 4/5   Hip Extension 4/5 4-/5   Hip ABD 4/5 4-/5   Hip ER 5/5 4/5   Hip IR  5/5 4/5   *indicates activity was associated with pain    Treatment:  Therapeutic Activities: x 15min  Goblet squat: 2 x 10, 10# KB  Step-up: 2 x 8 (B), 8\" step, 10# KB  Step-down: 1 x 10 (B), 8\" step  Reverse lunge: 1 x 10 (B)  Lateral lunge: 1 x108 (B)      Therapeutic Exercise: x 20min  Knee extension stretch: x 5 (R)   Knee hyperextension stretch: x 5 (R)   Knee flexion PROM: x 5 (R)   SLR: 2 x 10 (L), 3# ankle weight  Bridge marching: 2 x 8 (B)  Elastic band lateral walk: 1 lap x 30ft, red theraband  Elastic band monster walk: 1 lap x 30ft, red theraband    Manual Therapy: x 10min  Soft tissue mobilization to left knee: quads, adductors, ITB/VL, posterior knee    Provider Interactions With Patient:   Verbal and manual cueing on proper performance of the prescribed exercises. Assessment: Shanti Trammell arrives today after follow-up with her MD. She plans to continue with therapy for the remainder of the month and will gradually try returning to ballet participation in May. If she is still with difficulties at that time, she has an option of cortisone injection and/or surgical debridement, per MD. The patient was still apprehensive to go up the stairs backwards for her step-down exercise, but she was able to do it today, though mildly antalgic. The patient was encouraged to increase the depth of her squatting and lunging exercises as she feels comfortable. The patient would benefit from continued therapy for further progression in strength for SL activities and return to ballet participation. Goals:   Short-Term Goals:  1. The patient will improve quad strength and single-leg stance stability to demonstrate non-antalgic gait pattern with walking medium-length distances for household ambulation. Timeframe: 6 weeks. (IN PROGRESS 3/15/22. Patient still demonstrates mildly antalgic gait pattern. Updated goal timeframe. 2. The patient will improve LE strength and endurance to facilitate intermittent standing for extended periods of time for 2-3 hours daily for household and community ambulation. Timeframe: 6 weeks. (IN PROGRESS 3/15/22. Patient is able to stand 30-45 minutes before disruption due to symptoms. Updated goal timeframe.)   Long-Term Goals:  1.  The patient will improve LE strength and endurance to facilitate walking distances of 1-2 mile(s) daily for community integration. Timeframe: 4 weeks. (MET 3/15/22.)  2. Patient will improve lower motor control to perform double-leg squat with symmetrical loading, without asymmetries, and with proper posterior chain loading to facilitate squatting and lifting ADL's. Timeframe: 8 weeks. (IN PROGRESS 3/15/22. Patient is able to to squat to 60deg but shows mild weight shift squatting into deeper ranges. Updated goal timeframe.)  3. Patient will improve LE mobility, strength, and single-leg stabilization to meet strength requirements for reciprocal stair navigation pattern with no asymmetries for ascending and descending at least 3 flights of stairs daily for household and community ambulation. Timeframe: 8-10 weeks (IN PROGRESS. Patient is able to navigate 3 flights of stairs but is with remaining deviations for descending stairs. Updated goal timeframe.)   4. Patient will improve LE strength and dynamic knee stability to facilitate return to ballet participation for general fitness activities. Plan:  Continue to progress SL LE strengthening and dynamic knee stability. HEP: Patient was issued a HEP handout 3/15/22 including bridge marching, band lateral and monster walk, step-up, step-down, and split squats. Charges:  Ther Act x 1, Therex x 1, MT x 1     Total Timed Treatment: 45min    Total Treatment Time: 45min

## 2022-04-08 ENCOUNTER — OFFICE VISIT (OUTPATIENT)
Dept: PHYSICAL THERAPY | Age: 57
End: 2022-04-08
Attending: ORTHOPAEDIC SURGERY
Payer: COMMERCIAL

## 2022-04-08 PROCEDURE — 97530 THERAPEUTIC ACTIVITIES: CPT

## 2022-04-08 PROCEDURE — 97140 MANUAL THERAPY 1/> REGIONS: CPT

## 2022-04-08 PROCEDURE — 97110 THERAPEUTIC EXERCISES: CPT

## 2022-04-08 NOTE — PROGRESS NOTES
Date of Service: 4/8/2022  Dx: Dislocation of left patella, subsequent encounter (S83.005D)                Insurance: [address] [place] [address]  Insurance Limits: 60 visit limit  Visit #: 15  Authorized # of Visits: N/A  POC/Auth Expiration: N/A  Date of Last PN: 3/15/22 (Visit #9)  Authorizing Physician/Provider: Priyanka Urbano MD visit: N/A  Fall Risk: Standard         Precautions: None        Subjective: \"It's good. I'm so happy with the progress over the past two weeks. I can rub my knee and it's not painful. And there are actually times that I forget about it. \" The patient reports that she still feels some minor strength deficits with the stairs. She reports that a friend noticed that she is still walking with a small limp. Pain/Symptom Presentation: Patient reports localized pain over the medial knee.    Pain at rest: 0/10  Pain at worst: 4-5/10    Objective:     ROM:   Knee Motion PROM AROM    Right Left Right Left   Knee Extension +8 +3 +5 0   Knee Flexion 140 132* N/A N/A   *indicates activity was associated with pain    Strength/MMT:   Knee Motion Strength    Right Left   Knee Extension 5/5 4-/5*   Knee Flexion 4+/5 4-/5   *indicates activity was associated with pain    Hip Motion Strength    Right Left   Hip Flexion 5/5 4/5   Hip Extension 4/5 4-/5   Hip ABD 4/5 4-/5   Hip ER 5/5 4/5   Hip IR  5/5 4/5   *indicates activity was associated with pain    Treatment:  Therapeutic Activities: x 15min  Goblet squat: 2 x 10, 10# KB  Reverse lunge: 2 x 8 (B)  Lateral lunge: 2 x 8 (B)    Elevated SL Squat: 2 x 8 (B), 6\" step    Therapeutic Exercise: x 20min  Knee hyperextension stretch: x 5 (R)   SLR: 2 x 10 (L), 3# ankle weight  Bridge marching: 2 x 8 (B)  Elastic band lateral walk: 1 lap x 30ft, red theraband  Elastic band monster walk: 1 lap x 30ft, red theraband    Neuromuscular Re-education: x 3min  Lateral band chop: 1 x 8 (B), black power bad    Manual Therapy: x 7min  Soft tissue mobilization to left knee: quads, adductors, ITB/VL, posterior knee    Provider Interactions With Patient:   Verbal and manual cueing on proper performance of the prescribed exercises. Assessment: David Hsu was able to progress her lunging and squatting exercises this date with decent tolerance. The patient shows gradual progression in her knee extension/hyper-extension mobility. The patient would benefit from continued therapy for further progression in dynamic activities for full return to PL[facility]. Goals:   Short-Term Goals:  1. The patient will improve quad strength and single-leg stance stability to demonstrate non-antalgic gait pattern with walking medium-length distances for household ambulation. Timeframe: 6 weeks. (IN PROGRESS 3/15/22. Patient still demonstrates mildly antalgic gait pattern. Updated goal timeframe. 2. The patient will improve LE strength and endurance to facilitate intermittent standing for extended periods of time for 2-3 hours daily for household and community ambulation. Timeframe: 6 weeks. (IN PROGRESS 3/15/22. Patient is able to stand 30-45 minutes before disruption due to symptoms. Updated goal timeframe.)   Long-Term Goals:  1. The patient will improve LE strength and endurance to facilitate walking distances of 1-2 mile(s) daily for community integration. Timeframe: 4 weeks. (MET 3/15/22.)  2. Patient will improve lower motor control to perform double-leg squat with symmetrical loading, without asymmetries, and with proper posterior chain loading to facilitate squatting and lifting ADL's. Timeframe: 8 weeks. (IN PROGRESS 3/15/22. Patient is able to to squat to 60deg but shows mild weight shift squatting into deeper ranges. Updated goal timeframe.)  3.  Patient will improve LE mobility, strength, and single-leg stabilization to meet strength requirements for reciprocal stair navigation pattern with no asymmetries for ascending and descending at least 3 flights of stairs daily for household and community ambulation. Timeframe: 8-10 weeks (IN PROGRESS. Patient is able to navigate 3 flights of stairs but is with remaining deviations for descending stairs. Updated goal timeframe.)   4. Patient will improve LE strength and dynamic knee stability to facilitate return to ballet participation for general fitness activities. Plan:  Continue to progress SL LE strengthening and dynamic knee stability. HEP: Patient was issued a HEP handout 3/15/22 including bridge marching, band lateral and monster walk, step-up, step-down, and split squats. Charges:  Ther Act x 1, Therex x 1, MT x 1     Total Timed Treatment: 45min    Total Treatment Time: 45min

## 2022-04-13 ENCOUNTER — OFFICE VISIT (OUTPATIENT)
Dept: PHYSICAL THERAPY | Age: 57
End: 2022-04-13
Attending: ORTHOPAEDIC SURGERY
Payer: COMMERCIAL

## 2022-04-13 PROCEDURE — 97530 THERAPEUTIC ACTIVITIES: CPT

## 2022-04-13 PROCEDURE — 97140 MANUAL THERAPY 1/> REGIONS: CPT

## 2022-04-13 PROCEDURE — 97110 THERAPEUTIC EXERCISES: CPT

## 2022-04-13 NOTE — PROGRESS NOTES
Date of Service: 4/13/2022  Dx: Dislocation of left patella, subsequent encounter (S83.005D)                Insurance: [address] [place] [address]  Insurance Limits: 60 visit limit  Visit #: 14  Authorized # of Visits: N/A  POC/Auth Expiration: N/A  Date of Last PN: 3/15/22 (Visit #9)  Authorizing Physician/Provider: Jim Urbano MD visit: N/A  Fall Risk: Standard         Precautions: None        Subjective: \"I had a lot of pain after last time. It was sore most of the weekend. I thought I had set myself back, but it didn't stop me from doing what I need to do. I still need long walks with the dog. \"     Pain/Symptom Presentation: Patient reports localized pain over the medial knee.    Pain at rest: 0/10  Pain at worst: 5-6/10    Objective:     ROM:   Knee Motion PROM AROM    Right Left Right Left   Knee Extension +8 +3 +5 0   Knee Flexion 140 132* N/A N/A   *indicates activity was associated with pain    Strength/MMT:   Knee Motion Strength    Right Left   Knee Extension 5/5 4-/5*   Knee Flexion 4+/5 4-/5   *indicates activity was associated with pain    Hip Motion Strength    Right Left   Hip Flexion 5/5 4/5   Hip Extension 4/5 4-/5   Hip ABD 4/5 4-/5   Hip ER 5/5 4/5   Hip IR  5/5 4/5   *indicates activity was associated with pain    Treatment:  Therapeutic Activities: x 10min  Goblet squat: 2 x 10, 10# KB  Reverse lunge: 1 x 8 (B)  Lateral lunge: 1 x 8 (B)    Elevated SL squat: 1 x 8 (B), 6\" step    Therapeutic Exercise: x 15min  Knee hyperextension stretch: x 5 (R)   SLR: 2 x 10 (L), 3# ankle weight  Bridge marching: 2 x 8 (B)  Elastic band lateral walk: 1 lap x 30ft, red theraband  Elastic band monster walk: 1 lap x 30ft, red theraband    Neuromuscular Re-education: x 3min  Lateral band chop: 1 x 8 (B), black power bad    Manual Therapy: x 10min  Soft tissue mobilization to left knee: quads, adductors, ITB/VL, posterior knee    Provider Interactions With Patient:   Verbal and manual cueing on proper performance of the prescribed exercises. Assessment: Jordy Early reports some increased pain after her last session. We modified today's session to avoid further exacerbation of her condition and symptoms. We limited her exercises to one set of each, per patient request. The patient reports that the elevated SL squat is the only exercise that she feels some \"tenderness\" with. We will continue to progress strengthening as tolerated, monitoring tolerance and response to exercise progressions. Goals:   Short-Term Goals:  1. The patient will improve quad strength and single-leg stance stability to demonstrate non-antalgic gait pattern with walking medium-length distances for household ambulation. Timeframe: 6 weeks. (IN PROGRESS 3/15/22. Patient still demonstrates mildly antalgic gait pattern. Updated goal timeframe. 2. The patient will improve LE strength and endurance to facilitate intermittent standing for extended periods of time for 2-3 hours daily for household and community ambulation. Timeframe: 6 weeks. (IN PROGRESS 3/15/22. Patient is able to stand 30-45 minutes before disruption due to symptoms. Updated goal timeframe.)   Long-Term Goals:  1. The patient will improve LE strength and endurance to facilitate walking distances of 1-2 mile(s) daily for community integration. Timeframe: 4 weeks. (MET 3/15/22.)  2. Patient will improve lower motor control to perform double-leg squat with symmetrical loading, without asymmetries, and with proper posterior chain loading to facilitate squatting and lifting ADL's. Timeframe: 8 weeks. (IN PROGRESS 3/15/22. Patient is able to to squat to 60deg but shows mild weight shift squatting into deeper ranges. Updated goal timeframe.)  3.  Patient will improve LE mobility, strength, and single-leg stabilization to meet strength requirements for reciprocal stair navigation pattern with no asymmetries for ascending and descending at least 3 flights of stairs daily for household and community ambulation. Timeframe: 8-10 weeks (IN PROGRESS. Patient is able to navigate 3 flights of stairs but is with remaining deviations for descending stairs. Updated goal timeframe.)   4. Patient will improve LE strength and dynamic knee stability to facilitate return to ballet participation for general fitness activities. Plan: Follow-up on pain management after modifications to today's session. HEP: Patient was issued a HEP handout 3/15/22 including bridge marching, band lateral and monster walk, step-up, step-down, and split squats. Charges:  Ther Act x 1, Therex x 1, MT x 1     Total Timed Treatment: 38min    Total Treatment Time: 38min

## 2022-04-15 ENCOUNTER — OFFICE VISIT (OUTPATIENT)
Dept: PHYSICAL THERAPY | Age: 57
End: 2022-04-15
Attending: ORTHOPAEDIC SURGERY
Payer: COMMERCIAL

## 2022-04-15 PROCEDURE — 97110 THERAPEUTIC EXERCISES: CPT

## 2022-04-15 PROCEDURE — 97140 MANUAL THERAPY 1/> REGIONS: CPT

## 2022-04-15 PROCEDURE — 97530 THERAPEUTIC ACTIVITIES: CPT

## 2022-04-15 NOTE — PROGRESS NOTES
Date of Service: 4/15/2022  Dx: Dislocation of left patella, subsequent encounter (S83.005D)                Insurance: [address] [place] [address]  Insurance Limits: 60 visit limit  Visit #: 15  Authorized # of Visits: N/A  POC/Auth Expiration: N/A  Date of Last PN: 3/15/22 (Visit #9)  Authorizing Physician/Provider: Marisel Urbano MD visit: N/A  Fall Risk: Standard         Precautions: None        Subjective: \"It did not hurt at all after last session, so that was good. \"     Pain/Symptom Presentation: Patient reports localized pain over the medial knee. Pain at rest: 0/10  Pain at worst: 5-6/10    Objective:     ROM:   Knee Motion PROM AROM    Right Left Right Left   Knee Extension +8 +3 +5 0   Knee Flexion 140 132* N/A N/A   *indicates activity was associated with pain    Strength/MMT:   Knee Motion Strength    Right Left   Knee Extension 5/5 4-/5*   Knee Flexion 4+/5 4-/5   *indicates activity was associated with pain    Hip Motion Strength    Right Left   Hip Flexion 5/5 4/5   Hip Extension 4/5 4-/5   Hip ABD 4/5 4-/5   Hip ER 5/5 4/5   Hip IR  5/5 4/5   *indicates activity was associated with pain    Treatment:  Therapeutic Activities: x 10min  Goblet squat: 2 x 10, 10# KB  Reverse lunge: 1 x 10 (B)  Lateral lunge: 1 x 10 (B)    Elevated SL squat: 1 x 10 (B), 6\" step    Therapeutic Exercise: x 15min  Knee hyperextension stretch: x 5 (R)   SLR: 2 x 10 (L), 3# ankle weight  Bridge marching: 2 x 8 (B)  Elastic band lateral walk: 1 lap x 30ft, red theraband  Elastic band monster walk: 1 lap x 30ft, red theraband    Neuromuscular Re-education: x 3min  Lateral band chop: 1 x 8 (B), black power band    Manual Therapy: x 10min  Soft tissue mobilization to left knee: quads, adductors, ITB/VL, posterior knee    Provider Interactions With Patient:   Verbal and manual cueing on proper performance of the prescribed exercises. Assessment: Marie Barron was able to progress repetitions slightly this date.  We will be careful to progress the load to avoid further aggravation of symptoms. The patient would benefit from continued therapy for further progression in strength and stability of the left knee. Goals:   Short-Term Goals:  1. The patient will improve quad strength and single-leg stance stability to demonstrate non-antalgic gait pattern with walking medium-length distances for household ambulation. Timeframe: 6 weeks. (IN PROGRESS 3/15/22. Patient still demonstrates mildly antalgic gait pattern. Updated goal timeframe. 2. The patient will improve LE strength and endurance to facilitate intermittent standing for extended periods of time for 2-3 hours daily for household and community ambulation. Timeframe: 6 weeks. (IN PROGRESS 3/15/22. Patient is able to stand 30-45 minutes before disruption due to symptoms. Updated goal timeframe.)   Long-Term Goals:  1. The patient will improve LE strength and endurance to facilitate walking distances of 1-2 mile(s) daily for community integration. Timeframe: 4 weeks. (MET 3/15/22.)  2. Patient will improve lower motor control to perform double-leg squat with symmetrical loading, without asymmetries, and with proper posterior chain loading to facilitate squatting and lifting ADL's. Timeframe: 8 weeks. (IN PROGRESS 3/15/22. Patient is able to to squat to 60deg but shows mild weight shift squatting into deeper ranges. Updated goal timeframe.)  3. Patient will improve LE mobility, strength, and single-leg stabilization to meet strength requirements for reciprocal stair navigation pattern with no asymmetries for ascending and descending at least 3 flights of stairs daily for household and community ambulation. Timeframe: 8-10 weeks (IN PROGRESS. Patient is able to navigate 3 flights of stairs but is with remaining deviations for descending stairs. Updated goal timeframe.)   4.  Patient will improve LE strength and dynamic knee stability to facilitate return to [employer] participation for general fitness activities. Plan: Continue to progress LE strengthening and dynamic knee stabilization. HEP: Patient was issued a HEP handout 3/15/22 including bridge marching, band lateral and monster walk, step-up, step-down, and split squats. Charges:  Ther Act x 1, Therex x 1, MT x 1     Total Timed Treatment: 38min    Total Treatment Time: 38min

## 2022-04-20 ENCOUNTER — APPOINTMENT (OUTPATIENT)
Dept: PHYSICAL THERAPY | Age: 57
End: 2022-04-20
Attending: ORTHOPAEDIC SURGERY
Payer: COMMERCIAL

## 2022-04-22 ENCOUNTER — OFFICE VISIT (OUTPATIENT)
Dept: PHYSICAL THERAPY | Age: 57
End: 2022-04-22
Attending: ORTHOPAEDIC SURGERY
Payer: COMMERCIAL

## 2022-04-22 PROCEDURE — 97110 THERAPEUTIC EXERCISES: CPT

## 2022-04-22 PROCEDURE — 97140 MANUAL THERAPY 1/> REGIONS: CPT

## 2022-04-22 PROCEDURE — 97530 THERAPEUTIC ACTIVITIES: CPT

## 2022-04-22 NOTE — PROGRESS NOTES
Date of Service: 4/22/2022  Dx: Dislocation of left patella, subsequent encounter (S83.005D)                Insurance: [address] [place] [address]  Insurance Limits: 60 visit limit  Visit #: 12  Authorized # of Visits: N/A  POC/Auth Expiration: N/A  Date of Last PN: 3/15/22 (Visit #9)  Authorizing Physician/Provider: Randall Summers   Next MD visit: N/A  Fall Risk: Standard         Precautions: None        Subjective: \"My knee is feeling great. I walked with zero pain. The only time now that I notice it is with stairs. \"     Pain/Symptom Presentation: Patient reports localized pain over the medial knee. Pain at rest: 0/10  Pain at worst: 5-6/10    Objective:     ROM:   Knee Motion PROM AROM    Right Left Right Left   Knee Extension +8 +3 +5 0   Knee Flexion 140 132* N/A N/A   *indicates activity was associated with pain    Strength/MMT:   Knee Motion Strength    Right Left   Knee Extension 5/5 4-/5*   Knee Flexion 4+/5 4-/5   *indicates activity was associated with pain    Hip Motion Strength    Right Left   Hip Flexion 5/5 4/5   Hip Extension 4/5 4-/5   Hip ABD 4/5 4-/5   Hip ER 5/5 4/5   Hip IR  5/5 4/5   *indicates activity was associated with pain    Treatment:  Therapeutic Activities: x 10min  Goblet squat: 2 x 10, 10# KB  Reverse lunge: 2 x 6 (B)  Lateral lunge: 2 x 6 (B)    Elevated SL squat: 2 x 6 (B), 6\" step    Therapeutic Exercise: x 15min  Knee hyperextension stretch: x 5 (R)   SLR: 2 x 10 (L), 3# ankle weight  Bridge marching: 2 x 8 (B)  Elastic band lateral walk: 1 lap x 30ft, red theraband  Elastic band monster walk: 1 lap x 30ft, red theraband    Neuromuscular Re-education: x 3min  Lateral band chop: 2 x 6 (B), black power band    Manual Therapy: x 10min  Soft tissue mobilization to left knee: quads, adductors, ITB/VL, posterior knee    Provider Interactions With Patient:   Verbal and manual cueing on proper performance of the prescribed exercises.      Assessment: Terence Linn was able to progress repetitions slightly this date, up to 2 sets of 6 repetitions this date. We will monitor her tolerance after today's appt to determine ability to further progress her exercises. The patient reports improved tolerance to walking and is planning on doing some hiking with a friend next week. The patient was reassured that she is continuing to make progress even though she still notices a strength deficit on her involved side compared to the uninvolved side. Goals:   Short-Term Goals:  1. The patient will improve quad strength and single-leg stance stability to demonstrate non-antalgic gait pattern with walking medium-length distances for household ambulation. Timeframe: 6 weeks. (IN PROGRESS 3/15/22. Patient still demonstrates mildly antalgic gait pattern. Updated goal timeframe. 2. The patient will improve LE strength and endurance to facilitate intermittent standing for extended periods of time for 2-3 hours daily for household and community ambulation. Timeframe: 6 weeks. (IN PROGRESS 3/15/22. Patient is able to stand 30-45 minutes before disruption due to symptoms. Updated goal timeframe.)   Long-Term Goals:  1. The patient will improve LE strength and endurance to facilitate walking distances of 1-2 mile(s) daily for community integration. Timeframe: 4 weeks. (MET 3/15/22.)  2. Patient will improve lower motor control to perform double-leg squat with symmetrical loading, without asymmetries, and with proper posterior chain loading to facilitate squatting and lifting ADL's. Timeframe: 8 weeks. (IN PROGRESS 3/15/22. Patient is able to to squat to 60deg but shows mild weight shift squatting into deeper ranges. Updated goal timeframe.)  3. Patient will improve LE mobility, strength, and single-leg stabilization to meet strength requirements for reciprocal stair navigation pattern with no asymmetries for ascending and descending at least 3 flights of stairs daily for household and community ambulation.  Timeframe: 8-10 weeks (IN PROGRESS. Patient is able to navigate 3 flights of stairs but is with remaining deviations for descending stairs. Updated goal timeframe.)   4. Patient will improve LE strength and dynamic knee stability to facilitate return to ballet participation for general fitness activities. Plan: Continue to progress LE strengthening and dynamic knee stabilization. HEP: Patient was issued a HEP handout 3/15/22 including bridge marching, band lateral and monster walk, step-up, step-down, and split squats. Charges:  Ther Act x 1, Therex x 1, MT x 1     Total Timed Treatment: 38min    Total Treatment Time: 38min

## 2022-04-27 ENCOUNTER — OFFICE VISIT (OUTPATIENT)
Dept: PHYSICAL THERAPY | Age: 57
End: 2022-04-27
Attending: ORTHOPAEDIC SURGERY
Payer: COMMERCIAL

## 2022-04-27 PROCEDURE — 97110 THERAPEUTIC EXERCISES: CPT

## 2022-04-27 PROCEDURE — 97140 MANUAL THERAPY 1/> REGIONS: CPT

## 2022-04-27 PROCEDURE — 97530 THERAPEUTIC ACTIVITIES: CPT

## 2022-04-27 NOTE — PROGRESS NOTES
Date of Service: 4/27/2022  Dx: Dislocation of left patella, subsequent encounter (S83.005D)                Insurance: [address] [place] [address]  Insurance Limits: 60 visit limit  Visit #: 16  Authorized # of Visits: N/A  POC/Auth Expiration: N/A  Date of Last PN: 3/15/22 (Visit #9)  Authorizing Physician/Provider: Rom Urbano MD visit: N/A  Fall Risk: Standard         Precautions: None        Subjective: \"I'm still having pain going up and down the stairs. I noticed it this morning. It's not bad, it's only a 1-2, but I'm still feeling that. It's more pronounced on the inside of the knee. I took a really long walk yesterday and I wonder if I was compensating because my other leg is bothering me. I don't feel pain when I walk though. \"     Pain/Symptom Presentation: The patient reports a \"tension\" in the medial knee.   Pain at rest: 0/10  Pain at worst: 3-4/10    Objective:     ROM:   Knee Motion PROM AROM    Right Left Right Left   Knee Extension +8 +3 +5 0   Knee Flexion 140 132* N/A N/A   *indicates activity was associated with pain    Strength/MMT:   Knee Motion Strength    Right Left   Knee Extension 5/5 4-/5*   Knee Flexion 4+/5 4-/5   *indicates activity was associated with pain    Hip Motion Strength    Right Left   Hip Flexion 5/5 4/5   Hip Extension 4/5 4-/5   Hip ABD 4/5 4-/5   Hip ER 5/5 4/5   Hip IR  5/5 4/5   *indicates activity was associated with pain    Treatment:  Therapeutic Activities: x 15min  Goblet squat: 2 x 10, 10# KB  Reverse lunge: 2 x 8 (B)  Lateral lunge: 2 x 8 (B)    Elevated SL squat: 2 x 6 (B), 6\" step    Therapeutic Exercise: x 17min  Knee hyperextension stretch: x 5 (R)   SLR: 2 x 10 (L), 3# ankle weight  Bridge marching: 2 x 8 (B)  Elastic band lateral walk: 1 lap x 30ft, red theraband  Elastic band monster walk: 1 lap x 30ft, red theraband    Neuromuscular Re-education: x 3min  Lateral band chop: 2 x 8 (B), black power band    Manual Therapy: x 10min  Soft tissue mobilization to left knee: quads, adductors, ITB/VL, posterior knee    Provider Interactions With Patient:   Verbal and manual cueing on proper performance of the prescribed exercises. Assessment: Anh Hermosillo reports that she notices that she is still having mild discomfort with navigating stairs. We continue to progress LE strengthening exercises to facilitate better tolerance to stair navigation, but we've had to progress slowly due to exacerbation of her symptoms. We will continue to progress LE strengthening as tolerated. We discussed reducing frequency to once a week at the end of her current exercise regimen, in two weeks time. Goals:   Short-Term Goals:  1. The patient will improve quad strength and single-leg stance stability to demonstrate non-antalgic gait pattern with walking medium-length distances for household ambulation. Timeframe: 6 weeks. (IN PROGRESS 3/15/22. Patient still demonstrates mildly antalgic gait pattern. Updated goal timeframe. 2. The patient will improve LE strength and endurance to facilitate intermittent standing for extended periods of time for 2-3 hours daily for household and community ambulation. Timeframe: 6 weeks. (IN PROGRESS 3/15/22. Patient is able to stand 30-45 minutes before disruption due to symptoms. Updated goal timeframe.)   Long-Term Goals:  1. The patient will improve LE strength and endurance to facilitate walking distances of 1-2 mile(s) daily for community integration. Timeframe: 4 weeks. (MET 3/15/22.)  2. Patient will improve lower motor control to perform double-leg squat with symmetrical loading, without asymmetries, and with proper posterior chain loading to facilitate squatting and lifting ADL's. Timeframe: 8 weeks. (IN PROGRESS 3/15/22. Patient is able to to squat to 60deg but shows mild weight shift squatting into deeper ranges. Updated goal timeframe.)  3.  Patient will improve LE mobility, strength, and single-leg stabilization to meet strength requirements for reciprocal stair navigation pattern with no asymmetries for ascending and descending at least 3 flights of stairs daily for household and community ambulation. Timeframe: 8-10 weeks (IN PROGRESS. Patient is able to navigate 3 flights of stairs but is with remaining deviations for descending stairs. Updated goal timeframe.)   4. Patient will improve LE strength and dynamic knee stability to facilitate return to ballet participation for general fitness activities. Plan: Continue to progress LE strengthening and dynamic knee stabilization. Reduce frequency to once a week in another two weeks. HEP: Patient was issued a HEP handout 3/15/22 including bridge marching, band lateral and monster walk, step-up, step-down, and split squats. Charges:  Ther Act x 1, Therex x 1, MT x 1     Total Timed Treatment: 45min    Total Treatment Time: 45min

## 2022-04-29 ENCOUNTER — OFFICE VISIT (OUTPATIENT)
Dept: PHYSICAL THERAPY | Age: 57
End: 2022-04-29
Attending: ORTHOPAEDIC SURGERY
Payer: COMMERCIAL

## 2022-04-29 PROCEDURE — 97530 THERAPEUTIC ACTIVITIES: CPT

## 2022-04-29 PROCEDURE — 97110 THERAPEUTIC EXERCISES: CPT

## 2022-04-29 PROCEDURE — 97140 MANUAL THERAPY 1/> REGIONS: CPT

## 2022-04-29 NOTE — PROGRESS NOTES
Date of Service: 4/29/2022  Dx: Dislocation of left patella, subsequent encounter (S83.005D)                Insurance: [address] [place] [address]  Insurance Limits: 60 visit limit  Visit #: 25  Authorized # of Visits: N/A  POC/Auth Expiration: N/A  Date of Last PN: 3/15/22 (Visit #9)  Authorizing Physician/Provider: Maria Antonia Urbano MD visit: N/A  Fall Risk: Standard         Precautions: None        Subjective: \"I't's the same. Stairs still concern me, especially if I'm tired or if I'm carrying something. After I'm done here, [place] \"     Pain/Symptom Presentation: Patient denies any changes in pain rating 4/29/22.    Pain at rest: 0/10  Pain at worst: 3-4/10    Objective:     ROM:   Knee Motion PROM AROM    Right Left Right Left   Knee Extension +8 +3 +5 0   Knee Flexion 140 132* N/A N/A   *indicates activity was associated with pain    Strength/MMT:   Knee Motion Strength    Right Left   Knee Extension 5/5 4-/5*   Knee Flexion 4+/5 4-/5   *indicates activity was associated with pain    Hip Motion Strength    Right Left   Hip Flexion 5/5 4/5   Hip Extension 4/5 4-/5   Hip ABD 4/5 4-/5   Hip ER 5/5 4/5   Hip IR  5/5 4/5   *indicates activity was associated with pain    Treatment:  Therapeutic Activities: x 18min  Goblet squat: 2 x 10, 15# KB  Reverse lunge: 2 x 8 (B)  Lateral lunge: 2 x 8 (B)  Staggered stance sit to stand: 2 x 8 (B) to hi-low table    Elevated SL squat: 2 x 8 (B), 6\" step    Therapeutic Exercise: x 12min  Knee hyperextension stretch: x 5 (R)   Bridge marching: 2 x 8 (B)  Elastic band lateral walk: 1 lap x 30ft, red theraband  Elastic band monster walk: 1 lap x 30ft, red theraband    Neuromuscular Re-education: x 3min  Lateral band chop: 2 x 8 (B), black power band    Manual Therapy: x 10min  Soft tissue mobilization to left knee: quads, adductors, ITB/VL, posterior knee    Provider Interactions With Patient:   Verbal and manual cueing on proper performance of the prescribed exercises. Assessment: Timothy Lefort continues to have complaints of some pain with navigating stairs. We continue to progress LE strengthening exercises, though her progress has been slow recently. The patient reports that she was supposed to follow-up with her MD at the end of May, but she canceled that appt due to scheduling conflicts. She will see how she feels at that time. She would still like to be able to resume ballet participation but doesn't feel that she's ready for any jumping. The patient plans to try putting her bike on a  this weekend so she can do some additional cardio training on her own. We will continue therapy to facilitate additional SL strength and dynamic stability for full return to ADL's and PLF. Goals:   Short-Term Goals:  1. The patient will improve quad strength and single-leg stance stability to demonstrate non-antalgic gait pattern with walking medium-length distances for household ambulation. Timeframe: 6 weeks. (IN PROGRESS 3/15/22. Patient still demonstrates mildly antalgic gait pattern. Updated goal timeframe. 2. The patient will improve LE strength and endurance to facilitate intermittent standing for extended periods of time for 2-3 hours daily for household and community ambulation. Timeframe: 6 weeks. (IN PROGRESS 3/15/22. Patient is able to stand 30-45 minutes before disruption due to symptoms. Updated goal timeframe.)   Long-Term Goals:  1. The patient will improve LE strength and endurance to facilitate walking distances of 1-2 mile(s) daily for community integration. Timeframe: 4 weeks. (MET 3/15/22.)  2. Patient will improve lower motor control to perform double-leg squat with symmetrical loading, without asymmetries, and with proper posterior chain loading to facilitate squatting and lifting ADL's. Timeframe: 8 weeks. (IN PROGRESS 3/15/22. Patient is able to to squat to 60deg but shows mild weight shift squatting into deeper ranges. Updated goal timeframe.)  3. Patient will improve LE mobility, strength, and single-leg stabilization to meet strength requirements for reciprocal stair navigation pattern with no asymmetries for ascending and descending at least 3 flights of stairs daily for household and community ambulation. Timeframe: 8-10 weeks (IN PROGRESS. Patient is able to navigate 3 flights of stairs but is with remaining deviations for descending stairs. Updated goal timeframe.)   4. Patient will improve LE strength and dynamic knee stability to facilitate return to ballet participation for general fitness activities. Plan: Continue to progress LE strengthening and dynamic knee stabilization. Reduce frequency to once a week in another two weeks. HEP: Patient was issued a HEP handout 3/15/22 including bridge marching, band lateral and monster walk, step-up, step-down, and split squats. Charges:  Ther Act x 1, Therex x 1, MT x 1     Total Timed Treatment: 43min    Total Treatment Time: 43min

## 2022-05-04 ENCOUNTER — OFFICE VISIT (OUTPATIENT)
Dept: PHYSICAL THERAPY | Age: 57
End: 2022-05-04
Attending: ORTHOPAEDIC SURGERY
Payer: COMMERCIAL

## 2022-05-04 PROCEDURE — 97140 MANUAL THERAPY 1/> REGIONS: CPT

## 2022-05-04 PROCEDURE — 97110 THERAPEUTIC EXERCISES: CPT

## 2022-05-04 PROCEDURE — 97530 THERAPEUTIC ACTIVITIES: CPT

## 2022-05-04 NOTE — PROGRESS NOTES
Date of Service: 5/4/2022  Dx: Dislocation of left patella, subsequent encounter (S83.005D)                Insurance: [address] [place] [address]  Insurance Limits: 60 visit limit  Visit #: 23  Authorized # of Visits: N/A  POC/Auth Expiration: N/A  Date of Last PN: 3/15/22 (Visit #9)  Authorizing Physician/Provider: Joya Urbano MD visit: N/A  Fall Risk: Standard         Precautions: None        Subjective: The patient reports that she was a bit sore on Saturday after her 4.5 mile walk after her PT appt Friday. She reports that she is now noticing some anteromedial knee pain with passive knee flexion. Pain/Symptom Presentation: Patient denies any changes in pain rating 4/29/22.    Pain at rest: 0/10  Pain at worst: 3-4/10    Objective:     ROM:   Knee Motion PROM AROM    Right Left Right Left   Knee Extension +8 +3 +5 0   Knee Flexion 140 132* N/A N/A   *indicates activity was associated with pain    Strength/MMT:   Knee Motion Strength    Right Left   Knee Extension 5/5 4-/5*   Knee Flexion 4+/5 4-/5   *indicates activity was associated with pain    Hip Motion Strength    Right Left   Hip Flexion 5/5 4/5   Hip Extension 4/5 4-/5   Hip ABD 4/5 4-/5   Hip ER 5/5 4/5   Hip IR  5/5 4/5   *indicates activity was associated with pain    Treatment:  Therapeutic Activities: x 18min  Goblet squat: 2 x 10, 15# KB  Reverse lunge: 2 x 8 (B)  Lateral lunge: 2 x 8 (B)  Staggered stance sit to stand: 2 x 8 (B) to hi-low table    Elevated SL squat: 2 x 8 (B), 6\" step    Therapeutic Exercise: x 12min  Bridge marching: 2 x 8 (B)  Elastic band lateral walk: 1 lap x 20ft, green loop  Elastic band monster walk: 1 lap x 20ft, green loop    Neuromuscular Re-education: x 3min  Tibial ER joint mobs: Grade 3, 4 x 10\" (L) to improve knee flexion mobility  Lateral band chop: 2 x 8 (B), black power band    Manual Therapy: x 10min  Soft tissue mobilization to left knee: quads, adductors, ITB/VL, posterior knee    Provider Interactions With Patient:   Verbal and manual cueing on proper performance of the prescribed exercises. Assessment: David Hsu reports new superior and medial knee pain with end-range flexion. The patient was educated that this may be, in part, due to the load to the meniscus at end-range flexion. After tibial rotational joint mobs, the patient showed improved available range and improved pain management. The patient would benefit from continued physical therapy for further progression in dynamic knee stability. Goals:   Short-Term Goals:  1. The patient will improve quad strength and single-leg stance stability to demonstrate non-antalgic gait pattern with walking medium-length distances for household ambulation. Timeframe: 6 weeks. (IN PROGRESS 3/15/22. Patient still demonstrates mildly antalgic gait pattern. Updated goal timeframe. 2. The patient will improve LE strength and endurance to facilitate intermittent standing for extended periods of time for 2-3 hours daily for household and community ambulation. Timeframe: 6 weeks. (IN PROGRESS 3/15/22. Patient is able to stand 30-45 minutes before disruption due to symptoms. Updated goal timeframe.)   Long-Term Goals:  1. The patient will improve LE strength and endurance to facilitate walking distances of 1-2 mile(s) daily for community integration. Timeframe: 4 weeks. (MET 3/15/22.)  2. Patient will improve lower motor control to perform double-leg squat with symmetrical loading, without asymmetries, and with proper posterior chain loading to facilitate squatting and lifting ADL's. Timeframe: 8 weeks. (IN PROGRESS 3/15/22. Patient is able to to squat to 60deg but shows mild weight shift squatting into deeper ranges. Updated goal timeframe.)  3.  Patient will improve LE mobility, strength, and single-leg stabilization to meet strength requirements for reciprocal stair navigation pattern with no asymmetries for ascending and descending at least 3 flights of stairs daily for household and community ambulation. Timeframe: 8-10 weeks (IN PROGRESS. Patient is able to navigate 3 flights of stairs but is with remaining deviations for descending stairs. Updated goal timeframe.)   4. Patient will improve LE strength and dynamic knee stability to facilitate return to ballet participation for general fitness activities. Plan: Continue to progress LE strengthening and dynamic knee stabilization. HEP: Patient was issued a HEP handout 3/15/22 including bridge marching, band lateral and monster walk, step-up, step-down, and split squats. Charges:  Ther Act x 1, Therex x 1, MT x 1     Total Timed Treatment: 43min    Total Treatment Time: 43min

## 2022-05-10 ENCOUNTER — OFFICE VISIT (OUTPATIENT)
Dept: PHYSICAL THERAPY | Age: 57
End: 2022-05-10
Attending: ORTHOPAEDIC SURGERY
Payer: COMMERCIAL

## 2022-05-10 PROCEDURE — 97112 NEUROMUSCULAR REEDUCATION: CPT

## 2022-05-10 PROCEDURE — 97140 MANUAL THERAPY 1/> REGIONS: CPT

## 2022-05-10 NOTE — PROGRESS NOTES
Date of Service: 5/10/2022  Dx: Dislocation of left patella, subsequent encounter (S83.005D)                Insurance: [address] [place] [address]  Insurance Limits: 60 visit limit  Visit #: 20  Authorized # of Visits: N/A  POC/Auth Expiration: N/A  Date of Last PN: 3/15/22 (Visit #9)  Authorizing Physician/Provider: Amos Urbano MD visit: N/A  Fall Risk: Standard         Precautions: None        Subjective: \"It feels fine the only thing that that I feel is going up and down stairs. The only thing that I'm concerned with is that I cannot engage my quad on my left side. It doesn't feel the same as the right side. \"     Pain/Symptom Presentation: Patient denies any changes in pain rating 4/29/22.    Pain at rest: 0/10  Pain at worst: 3-4/10    Objective:     ROM:   Knee Motion PROM AROM    Right Left Right Left   Knee Extension +8 +3 +5 0   Knee Flexion 140 132* N/A N/A   *indicates activity was associated with pain    Strength/MMT:   Knee Motion Strength    Right Left   Knee Extension 5/5 4-/5*   Knee Flexion 4+/5 4-/5   *indicates activity was associated with pain    Hip Motion Strength    Right Left   Hip Flexion 5/5 4/5   Hip Extension 4/5 4-/5   Hip ABD 4/5 4-/5   Hip ER 5/5 4/5   Hip IR  5/5 4/5   *indicates activity was associated with pain    Treatment:  Therapeutic Activities: x 6min  Goblet squat: 2 x 10, 15# KB  Elevated SL squat: 2 x 8 (B), 6\" step    Neuromuscular Re-education: x 25min  Quad set: 2 x 10 x 5\" (L) (for quad activation)  Seated TKE into SB: x 20 (for quad activation)  LAQ: x 20 (L), 6# ankle weight with bolivar hold at end-range (for quad acitivation)  TKE: x 20 (L), red theraband, mild difficulty initiating quad activation (for quad activation)  TKE into ball against wall: x 20 (L) (for quad activation)  Lateral band chop: 2 x 8 (B), black power band    Manual Therapy: x 10min  Soft tissue mobilization to left knee: quads, adductors, ITB/VL, posterior knee    Provider Interactions With Patient: Modified exercises to focus on additional quad activation. Assessment: Wai Gomez reports that she is noticing difficulty activating her quad and is with less muscle tone. The patient was educated that the muscle tone loss is an anticipated deficit after injury but she did demonstrate difficulty with quad activation in standing. The patient was taken through additional quad activation exercises. For squatting exercises, cueing the patient to push through her heels and with bolivar hold helped with quad activation and loading. We will continue to progress quad strength and activation exercises. Goals:   Short-Term Goals:  1. The patient will improve quad strength and single-leg stance stability to demonstrate non-antalgic gait pattern with walking medium-length distances for household ambulation. Timeframe: 6 weeks. (IN PROGRESS 3/15/22. Patient still demonstrates mildly antalgic gait pattern. Updated goal timeframe. 2. The patient will improve LE strength and endurance to facilitate intermittent standing for extended periods of time for 2-3 hours daily for household and community ambulation. Timeframe: 6 weeks. (IN PROGRESS 3/15/22. Patient is able to stand 30-45 minutes before disruption due to symptoms. Updated goal timeframe.)   Long-Term Goals:  1. The patient will improve LE strength and endurance to facilitate walking distances of 1-2 mile(s) daily for community integration. Timeframe: 4 weeks. (MET 3/15/22.)  2. Patient will improve lower motor control to perform double-leg squat with symmetrical loading, without asymmetries, and with proper posterior chain loading to facilitate squatting and lifting ADL's. Timeframe: 8 weeks. (IN PROGRESS 3/15/22. Patient is able to to squat to 60deg but shows mild weight shift squatting into deeper ranges. Updated goal timeframe.)  3.  Patient will improve LE mobility, strength, and single-leg stabilization to meet strength requirements for reciprocal stair navigation pattern with no asymmetries for ascending and descending at least 3 flights of stairs daily for household and community ambulation. Timeframe: 8-10 weeks (IN PROGRESS. Patient is able to navigate 3 flights of stairs but is with remaining deviations for descending stairs. Updated goal timeframe.)   4. Patient will improve LE strength and dynamic knee stability to facilitate return to ballet participation for general fitness activities. Plan: Continue to progress quad activation and strengthening exercises. HEP: Verbally instructed patient to perform quad sets, LAQ, and TKE with ball/towel behind her knee for additional quad activation.        Charges: NMR x 2, MT x 1     Total Timed Treatment: 41min    Total Treatment Time: 41min

## 2022-05-19 ENCOUNTER — OFFICE VISIT (OUTPATIENT)
Dept: PHYSICAL THERAPY | Age: 57
End: 2022-05-19
Attending: ORTHOPAEDIC SURGERY
Payer: COMMERCIAL

## 2022-05-19 PROCEDURE — 97140 MANUAL THERAPY 1/> REGIONS: CPT

## 2022-05-19 PROCEDURE — 97112 NEUROMUSCULAR REEDUCATION: CPT

## 2022-05-19 PROCEDURE — 97530 THERAPEUTIC ACTIVITIES: CPT

## 2022-05-19 NOTE — PROGRESS NOTES
Date of Service: 5/19/2022  Dx: Dislocation of left patella, subsequent encounter (S83.005D)                Insurance: [address] [place] [address]  Insurance Limits: 60 visit limit  Visit #: 21  Authorized # of Visits: N/A  POC/Auth Expiration: N/A  Date of Last PN: 3/15/22 (Visit #9)  Authorizing Physician/Provider: Pernell Urbano MD visit: N/A  Fall Risk: Standard         Precautions: None        Subjective: \"I'm doing pretty good. I have been doing a lot of digging and heavy lifting in the garden. I'm digging holes and transplanting It's still just the stairs. I feel it as soon as I start the stairs. It hasn't stopped me from doing anything. I got the email about signing up for dance class and I think I'm going to have to really monitor the swelling. \" The patient reports that the pain with stairs hasn't improved at all recently. At this point, the patient doesn't feel that surgery is necessary based on the remaining functional limitations that she has.      Pain/Symptom Presentation:   Pain at rest: 0/10  Pain at worst: 3-4/10    Objective:     ROM:   Knee Motion PROM AROM    Right Left Right Left   Knee Extension +8 +3 +5 0   Knee Flexion 140 132* N/A N/A   *indicates activity was associated with pain    Strength/MMT:   Knee Motion Strength    Right Left   Knee Extension 5/5 4-/5*   Knee Flexion 4+/5 4-/5   *indicates activity was associated with pain    Hip Motion Strength    Right Left   Hip Flexion 5/5 4/5   Hip Extension 4/5 4-/5   Hip ABD 4/5 4-/5   Hip ER 5/5 4/5   Hip IR  5/5 4/5   *indicates activity was associated with pain    Treatment:  Therapeutic Activities: x 7min  Goblet squat: 2 x 10, 15# KB  Elevated SL squat: 2 x 8 (B), 6\" step, mild pain during eccentric phase    Neuromuscular Re-education: x 18min  Quad set: 2 x 10 x 5\" (L) (for quad activation)  LAQ: x 20 (L), 6# ankle weight with bolivar hold at end-range (for quad acitivation)  Seated TKE into SB: x 20 (for quad activation)  TKE into ball against wall: x 20 (L) (for quad activation)  Elastic band IR/ER: x 20 (B), green loop  Lateral band chop: 2 x 8 (B), black power band    Manual Therapy: x 15min  Soft tissue mobilization to left knee: quads, adductors, ITB/VL, posterior knee    Provider Interactions With Patient:   Discussed remaining functional deficits and pain complaints and discussed patient's thoughts on surgical intervention. Assessment: Ronni Melissa is still with pain complaints with ascending stairs. She is able to do a lot functionally otherwise, including garden and yardwork, but is still with pain when navigating stairs. In discussion with the patient she doesn't feel that her current functional deficits make her feel that surgery is necessary. She still notes strength deficits and pain during eccentric step-down exercise this date. We continue to work on quad strength and dynamic knee stability in therapy and will continue to do so. The patient was encouraged to try returning to ballet, but to monitor her tolerance closely. Goals:   Short-Term Goals:  1. The patient will improve quad strength and single-leg stance stability to demonstrate non-antalgic gait pattern with walking medium-length distances for household ambulation. Timeframe: 6 weeks. (IN PROGRESS 3/15/22. Patient still demonstrates mildly antalgic gait pattern. Updated goal timeframe. 2. The patient will improve LE strength and endurance to facilitate intermittent standing for extended periods of time for 2-3 hours daily for household and community ambulation. Timeframe: 6 weeks. (IN PROGRESS 3/15/22. Patient is able to stand 30-45 minutes before disruption due to symptoms. Updated goal timeframe.)   Long-Term Goals:  1. The patient will improve LE strength and endurance to facilitate walking distances of 1-2 mile(s) daily for community integration. Timeframe: 4 weeks. (MET 3/15/22.)  2.  Patient will improve lower motor control to perform double-leg squat with symmetrical loading, without asymmetries, and with proper posterior chain loading to facilitate squatting and lifting ADL's. Timeframe: 8 weeks. (IN PROGRESS 3/15/22. Patient is able to to squat to 60deg but shows mild weight shift squatting into deeper ranges. Updated goal timeframe.)  3. Patient will improve LE mobility, strength, and single-leg stabilization to meet strength requirements for reciprocal stair navigation pattern with no asymmetries for ascending and descending at least 3 flights of stairs daily for household and community ambulation. Timeframe: 8-10 weeks (IN PROGRESS. Patient is able to navigate 3 flights of stairs but is with remaining deviations for descending stairs. Updated goal timeframe.)   4. Patient will improve LE strength and dynamic knee stability to facilitate return to ballet participation for general fitness activities. Plan: Continue to progress quad activation and strengthening exercises. HEP: Verbally instructed patient to perform quad sets, LAQ, and TKE with ball/towel behind her knee for additional quad activation.        Charges: NMR x 1, Ther Act x 1, MT x 1     Total Timed Treatment: 40min    Total Treatment Time: 40min

## 2022-05-27 ENCOUNTER — OFFICE VISIT (OUTPATIENT)
Dept: PHYSICAL THERAPY | Age: 57
End: 2022-05-27
Attending: ORTHOPAEDIC SURGERY
Payer: COMMERCIAL

## 2022-05-27 PROCEDURE — 97140 MANUAL THERAPY 1/> REGIONS: CPT

## 2022-05-27 PROCEDURE — 97110 THERAPEUTIC EXERCISES: CPT

## 2022-05-27 PROCEDURE — 97112 NEUROMUSCULAR REEDUCATION: CPT

## 2022-05-27 NOTE — PROGRESS NOTES
Date of Service: 5/27/2022  Dx: Dislocation of left patella, subsequent encounter (S83.005D)                Insurance: [address] [place] [address]  Insurance Limits: 60 visit limit  Visit #: 25  Authorized # of Visits: N/A  POC/Auth Expiration: N/A  Date of Last PN: 3/15/22 (Visit #9)  Authorizing Physician/Provider: Liliana Urbano MD visit: N/A  Fall Risk: Standard         Precautions: None        Subjective: The patient reports similar pain complaints. She still reports pain with going up and down stairs but is able to tolerate yardwork well. She has decided to forego this next session of ballet due to remaining symptoms.      Pain/Symptom Presentation:   Pain at rest: 0/10  Pain at worst: 3-4/10    Objective:     ROM:   Knee Motion PROM AROM    Right Left Right Left   Knee Extension +8 +3 +5 0   Knee Flexion 140 132* N/A N/A   *indicates activity was associated with pain    Strength/MMT:   Knee Motion Strength    Right Left   Knee Extension 5/5 4-/5*   Knee Flexion 4+/5 4-/5   *indicates activity was associated with pain    Hip Motion Strength    Right Left   Hip Flexion 5/5 4/5   Hip Extension 4/5 4-/5   Hip ABD 4/5 4-/5   Hip ER 5/5 4/5   Hip IR  5/5 4/5   *indicates activity was associated with pain    Treatment:  Therapeutic Activities: x 15min  Goblet squat: 2 x 10, 15# KB  Elevated SL squat: 2 x 8 (B), 6\" step  Reverse slider lunge: 2 x 8 (B)  Lateral slider lunge: 2 x 8 (B)     Neuromuscular Re-education: x 18min  LAQ: x 20 (L), 6# ankle weight with bolivar hold at end-range (for quad acitivation)  TKE into ball against wall: x 20 (L) (for quad activation)  Elastic band IR/ER: x 20 (B), green loop  Elastic band lateral walk: 2 laps x 20ft, green loop  Lateral band chop: 2 x 8 (B), black power band    Manual Therapy: x 10min  Soft tissue mobilization to left knee: quads, adductors, ITB/VL, posterior knee    Provider Interactions With Patient:   Had another discussion with patient about possible surgical intervention Assessment: Ana Keenan continue to have complaints of knee pain with ascending stairs. She is able to tolerate yardwork well. She has not yet returned to ballet participation and has elected to forego the next ballet session. The patient still feels that her remaining symptoms do not warrant surgery. She may be open to long-term activity modification. We will continue to progress the patient as tolerated. Goals:   Short-Term Goals:  1. The patient will improve quad strength and single-leg stance stability to demonstrate non-antalgic gait pattern with walking medium-length distances for household ambulation. Timeframe: 6 weeks. (IN PROGRESS 3/15/22. Patient still demonstrates mildly antalgic gait pattern. Updated goal timeframe. 2. The patient will improve LE strength and endurance to facilitate intermittent standing for extended periods of time for 2-3 hours daily for household and community ambulation. Timeframe: 6 weeks. (IN PROGRESS 3/15/22. Patient is able to stand 30-45 minutes before disruption due to symptoms. Updated goal timeframe.)   Long-Term Goals:  1. The patient will improve LE strength and endurance to facilitate walking distances of 1-2 mile(s) daily for community integration. Timeframe: 4 weeks. (MET 3/15/22.)  2. Patient will improve lower motor control to perform double-leg squat with symmetrical loading, without asymmetries, and with proper posterior chain loading to facilitate squatting and lifting ADL's. Timeframe: 8 weeks. (IN PROGRESS 3/15/22. Patient is able to to squat to 60deg but shows mild weight shift squatting into deeper ranges. Updated goal timeframe.)  3. Patient will improve LE mobility, strength, and single-leg stabilization to meet strength requirements for reciprocal stair navigation pattern with no asymmetries for ascending and descending at least 3 flights of stairs daily for household and community ambulation. Timeframe: 8-10 weeks (IN PROGRESS.  Patient is able to navigate 3 flights of stairs but is with remaining deviations for descending stairs. Updated goal timeframe.)   4. Patient will improve LE strength and dynamic knee stability to facilitate return to ballet participation for general fitness activities. Plan: Continue to progress quad activation and strengthening exercises. HEP: Verbally instructed patient to perform quad sets, LAQ, and TKE with ball/towel behind her knee for additional quad activation.        Charges: NMR x 1, Ther Act x 1, MT x 1     Total Timed Treatment: 43min    Total Treatment Time: 43min

## 2022-06-10 ENCOUNTER — OFFICE VISIT (OUTPATIENT)
Dept: PHYSICAL THERAPY | Age: 57
End: 2022-06-10
Attending: ORTHOPAEDIC SURGERY
Payer: COMMERCIAL

## 2022-06-10 PROCEDURE — 97530 THERAPEUTIC ACTIVITIES: CPT

## 2022-06-10 PROCEDURE — 97112 NEUROMUSCULAR REEDUCATION: CPT

## 2022-06-10 PROCEDURE — 97140 MANUAL THERAPY 1/> REGIONS: CPT

## 2022-06-10 NOTE — PATIENT INSTRUCTIONS
Patient was issued a HEP handout from [email or web address]. Exercise selection, recommended resistance, and proper form/technique were reviewed with the patient. Patient verbalized understanding/comprehension of instruction and recommendations. View at [email or web address] using code: [id number]

## 2022-08-12 ENCOUNTER — TELEPHONE (OUTPATIENT)
Dept: INTERNAL MEDICINE CLINIC | Facility: CLINIC | Age: 57
End: 2022-08-12

## 2022-10-31 NOTE — Clinical Note
Missy/Yulisa, I saw this patient yesterday evening.  She essentially came in to establish care and I did this visit as a Z00.00 coding - aka annual physical. She literally has no significant medical history so I converted this visit into a wellness visit, es Statement Selected

## 2022-12-28 ENCOUNTER — OFFICE VISIT (OUTPATIENT)
Facility: CLINIC | Age: 57
End: 2022-12-28
Payer: COMMERCIAL

## 2022-12-28 VITALS
BODY MASS INDEX: 21.68 KG/M2 | SYSTOLIC BLOOD PRESSURE: 126 MMHG | HEIGHT: 64 IN | HEART RATE: 67 BPM | DIASTOLIC BLOOD PRESSURE: 60 MMHG | WEIGHT: 127 LBS

## 2022-12-28 DIAGNOSIS — N94.10 DYSPAREUNIA IN FEMALE: ICD-10-CM

## 2022-12-28 DIAGNOSIS — Z01.411 ENCOUNTER FOR WELL WOMAN EXAM WITH ABNORMAL FINDINGS: Primary | ICD-10-CM

## 2022-12-28 DIAGNOSIS — M62.89 PELVIC FLOOR DYSFUNCTION IN FEMALE: ICD-10-CM

## 2022-12-28 DIAGNOSIS — Z12.31 ENCOUNTER FOR SCREENING MAMMOGRAM FOR MALIGNANT NEOPLASM OF BREAST: ICD-10-CM

## 2022-12-28 PROCEDURE — 87480 CANDIDA DNA DIR PROBE: CPT | Performed by: OBSTETRICS & GYNECOLOGY

## 2022-12-28 PROCEDURE — 87510 GARDNER VAG DNA DIR PROBE: CPT | Performed by: OBSTETRICS & GYNECOLOGY

## 2022-12-28 PROCEDURE — 87660 TRICHOMONAS VAGIN DIR PROBE: CPT | Performed by: OBSTETRICS & GYNECOLOGY

## 2022-12-28 RX ORDER — ESTRADIOL 10 UG/1
TABLET VAGINAL
COMMUNITY
Start: 2022-11-07

## 2022-12-28 RX ORDER — ESTRADIOL 0.1 MG/G
0.5 CREAM VAGINAL DAILY
Qty: 42.5 G | Refills: 3 | Status: SHIPPED | OUTPATIENT
Start: 2022-12-28

## 2022-12-28 NOTE — PATIENT INSTRUCTIONS
The Role of Physical Therapy in the Treatment of Pelvic Floor Dysfunction:    Physical therapists are trained to evaluate and treat dysfunctions in the joints, muscles, nerves and scar. Physical therapists specifically trained in the area of pelvic health can identify the possible musculoskeletal causes of pelvic pain, bladder and bowel difficulties and develop a treatment plan specific to the individual suffering from this difficulties. What to expect at your first physical therapy appointment:   Your first visit will include an initial evaluation in a comfortable, private room by a therapist who has undergone advanced education and training in the evaluation and treatment of pelvic muscle dysfunction. The therapist will obtain a detailed history of your health, pain and activity limitations. She will also ask you about any bowel, bladder and sexual difficulties as these are in part controlled by the pelvic muscles. The therapist will then take a look at your posture, mobility of your spine and hips and strength and flexibility of pelvic girdle muscles. She will examine any scar tissue and trigger points in the muscles of your pelvic region. The therapist will also specifically examine the pelvic floor muscles. Your pelvic floor consists of a group of muscles that attach behind the pubic bone in the front to the tail bone in the back. They are responsible for providing support to the pelvic joints and organs, relaxing to allow the passage of urine, stool and gas and amy to prevent the loss of urine, stool and gas as appropriate. In order to best examine these muscles you will be asked to undress from the waist down and be covered with a sheet. The therapist will use a lubricated, gloved finger to identify painful muscles around and in your vagina or rectum then instruct you to contract and relax these muscles in order to determine how the muscles are functioning.  Care is taken to make you as comfortable as possible with the exam.     Your therapist will discuss the evaluation results with you and provide you with education regarding your specific condition and the expectation of therapy. She will answer all of your questions and will work with you to establish a treatment plan based on the results of the evaluation and your goals for therapy. THE [facility] Pelvic Floor Physical Therapy    [facility], [address] [place] [address] [place], [address] [facility]. Ph: [phone]  [address], [facility] A, 2nd floor. [facility]. Ph: [phone] & [phone]  [address], [place], [address] [facility]. Ph [phone] [address], [address]. Ph [phone]  [address], [facility], [facility]. Ph [phone]  [address] [place] [address], [facility]. Ph [phone]      Lubricants  Aloe Cadabra  Good Clean Love  Pre-Seed (targeted for those attempting to conceive)   Restore  *Lubricants that are hypo-osmolar or iso-osmolar are preferable to hyper-osmolar formulations.

## 2023-01-01 PROBLEM — N94.10 DYSPAREUNIA IN FEMALE: Status: ACTIVE | Noted: 2023-01-01

## 2023-01-01 PROBLEM — M62.89 PELVIC FLOOR DYSFUNCTION IN FEMALE: Status: ACTIVE | Noted: 2023-01-01

## 2023-01-12 ENCOUNTER — TELEPHONE (OUTPATIENT)
Dept: PHYSICAL THERAPY | Facility: HOSPITAL | Age: 58
End: 2023-01-12

## 2023-01-16 ENCOUNTER — OFFICE VISIT (OUTPATIENT)
Dept: PHYSICAL THERAPY | Age: 58
End: 2023-01-16
Attending: OBSTETRICS & GYNECOLOGY
Payer: COMMERCIAL

## 2023-01-16 DIAGNOSIS — M62.89 PELVIC FLOOR DYSFUNCTION IN FEMALE: ICD-10-CM

## 2023-01-16 DIAGNOSIS — N94.10 DYSPAREUNIA IN FEMALE: ICD-10-CM

## 2023-01-16 PROCEDURE — 97140 MANUAL THERAPY 1/> REGIONS: CPT

## 2023-01-16 PROCEDURE — 97112 NEUROMUSCULAR REEDUCATION: CPT

## 2023-01-16 PROCEDURE — 97163 PT EVAL HIGH COMPLEX 45 MIN: CPT

## 2023-01-16 PROCEDURE — 97535 SELF CARE MNGMENT TRAINING: CPT

## 2023-01-16 NOTE — PATIENT INSTRUCTIONS
DIAPHRAGMATIC BREATHING     The diaphragm is a dome shaped muscle that forms the floor of the rib cage. It is the most efficient muscle for breathing and using this muscle aides in relaxation. Diaphragmatic breathing is an important technique to learn because it helps settle down or relax the autonomic nervous system. The correct use of diaphragmatic breathing can help to quiet brain activity resulting in the relaxation of all the muscles and organs of the body. This is accomplished by slow rhythmic breathing concentrated in the diaphragm muscle rather than the upper chest.      HOW TO DO PROPER RELAXATION BREATHING  Start by lying on your back or reclining in a chair in a relaxed position. Place your hands around the lower portion of your rib cage. Relax your jaw by placing your tongue on the roof of your mouth and keeping your teeth slightly apart. Take a deep breath in for 4 count through your nose, letting your rib cage widen and your abdomen expand for 4 second hold. Keep your upper chest, neck and shoulders relaxed as you breathe in. As you breathe out for 84count through your mouth, allow your abdomen and chest to fall. Exhale completely then bulge perineum. Remember to breathe slowly. Do not force your breathing. Practice this for _10__minutes. Slippery Stuff with vibrator recommendation.

## 2023-01-16 NOTE — PROGRESS NOTES
MUSCULOSKELETAL AND PELVIC FLOOR EVALUATION:     Referring Physician: Dr. 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 Dryness with intercourse unable to Orgasm with clitoral or vaginal stimulation; and pain with penetration/ deeper thrust during intercourse. Allergic reaction from Vaginal Lubricant and food allergies (egg/ nut.)  Yuafem , Vaginal Estrogen since 10/2021. But costly and will switch to Estradiol Cream next month. Urination 5-6x/ day; infrequent Nocturia; and denies leakage. BM 1-2x/ day with [place] stool #3-4. Asthmatic; L Patella Subluxation      Current symptoms include: vaginal pain    Pt describes Vaginal pain level: current 0/10, best 0/10, [place] worst 7-8/10. Quality: sharp    Pregnant Now: No  Obstetrical/Gynecological history: # 1 - Date: 05[birth year]08[birth year], Sex: Female, Weight: 7 lb 7 oz, GA: 40w0d, Delivery: Caesarean Section, Apgar1: None, Apgar5: None, Living: Living, Birth Comments: None       Urodynamic Test: NT  Manometry: NT  Occupation/Activities: Retried Principal    PFDI 20    Scores   POPDI 6:    25   CRAD 8:    25   CHALO 6:    25   Summary:    77         Betzaida describes prior level of function: 2020 due to stress and perimenopausal changes. Pt goals include \"To have intercourse without pain and Understand Menopausal changes. \"    Past medical history was reviewed with Joelle Rubio. Significant findings include  has a past medical history of Anxiety (02/2017), Dyspareunia, Nut allergy, Problems with swallowing (1976), Stress (Work related), Tubulovillous adenoma of colon (03/31/2021), and Wears glasses (10/2010). She has no past medical history of Seizure disorder ([facility] [address]        Precautions:  Visual Impairment    URINARY HABITS  Types of symptoms: Urination 5-6x/ day; Nocturia infrequently; and denies leakage. BOWEL HABITS  Types of symptoms: BM 1-2x/ day with [place] stool #3-4 without straining. SEXUAL HEALTH STATUS  Marinoff Scale: 3  History of Sexual Abuse: No  Sexual [place] Status: Active but infrequent due to pain. 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 She also reports dryness with intercourse;  is  unable to Orgasm with clitoral or vaginal stimulation; and has pain with penetration/ deeper thrust during intercourse. She has an allergic reaction from Vaginal Lubricant and has food allergies (egg/ nut.)  She was taking Nicci Courser) Vaginal Estrogen since 10/2021  but it is costly and will switch to Estradiol Cream next month. She reports Urination 5-6x/ day; infrequent Nocturia; and denies leakage. She has a Bowel Movement 1-2x/ day with [place] stool #3-4. The results of the objective tests and measures show R Patellofemoral chondro malacia; L Patella subluxation but better with course of orthopedic PT;  scar tissue adhesion R>L; Transverse Abdominis 4/5 weakness; Menopausal Changes; Vestibule Reflex hyper bilateral; Hemorrhoidal tag; Levator Ani 3/5 strength with 6 count endurance; Min Anterior/ Posterior Vaginal wall descent with valsalva; Pubococcygeus/Pubovaginalis B moderate restriction; Iliococcygeus B moderate restriction; and Coccygeus B moderate restriction. Functional deficits include but are not limited to vaginal pain with a Pelvic Floor Distress Inventory of 75 /300. Signs and symptoms are consistent with diagnosis of  Dyspareunia in female (N94.10) & Pelvic floor dysfunction in female (M61.80). Pt and PT discussed evaluation findings, pathology, POC and HEP.   Pt voiced understanding and performs HEP correctly without reported pain. Skilled Pelvic Physical Therapy is medically necessary to address the above impairments and reach functional goals. OBJECTIVE:   Posture: R Dominant; R PF chondro malacia; L Patella subluxation with course of orthopedic PT. Pelvic Alignment: WNL  Gait: pt ambulates on level ground with normal mechanics. External Palpation: WNL  Scars (location/surgery):  scar tissue adhesion R>L  Abdominal Wall Integrity: WNL  Diastasis Recti: (finger width depth while contracted)  Umbilicus: 0    Range Of Motion  Lumbar AROM screen: WNL  LE AROM screen: grossly WNL       Strength (MMT) 5/5 RADHA LE   Transverse Abdominis: 4/5    Flexibility Summary: WNL RADHA LE     Orthopedic Summary R PF chondro malacia; L Patella subluxation with course of orthopedic PT;  scar tissue adhesion R>L; Transverse Abdominis 4/5 weakness; Informed verbal consent for internal pelvic evaluation given: Yes    External Observation:   Voluntary contraction: present   Voluntary relaxation: present  Involuntary contraction: absent  Involuntary relaxation: absent    Mons pubis: WNL  Labia majora: WNL  Labia minora: WNL  Urethral meatus: WNL  Introitus: WNL  Perineal body: other: WFL   Hemorrhoidal tag    Sensory/Reflex:  Vestibule: hyper bilateral  Anal [place]: Not Tested    Internal Examination   Scar: N/A    Pelvic Floor Muscle strength: (PERF= Power/Endurance/Reps/Fast) MMT: 3/6/5/10  External Anal Sphincter: NT  Accessory Muscle Use: none    Tissue Laxity Test:  Anterior Wall: Min  Posterior Wall: Min  Apical: WNL    Internal Palpation: WNL except Pubococcygeus/Pubovaginalis Bmoderate restriction  Iliococcygeus B moderate restriction  Coccygeus B moderate restriction    Internal Vaginal Summary: Menopausal Changes;  Vestibule Reflex hyper bilateral; Hemorrhoidal tag; Levator Ani 3/5 strength with 6 count endurance; Min Anterior/ Posterior Vaginal wall descent with valsalva; Pubococcygeus/Pubovaginalis Moderate restriction; Iliococcygeus B moderate restriction; and Coccygeus B moderate restriction    Today's Treatment and Response:   Patient education was provided on objective findings of external and internal evaluation and expectations with treatment outcomes. Patient was educated on and issued a HEP for:  pelvic anatomy and function with diagrams and pelvic model, diaphragmatic breathing for PNS activation and pelvic floor relaxation  and coordination of diaphragmatic breathing and pelvic floor contraction    Vibrator and Slippery stuff recommendation  MFR to 3rd posterior layer of PFM with 4/7/8 DB for PFM relaxation    Charges: PT Eval High Complexity, 1SC, 1NM, 1MT      Total Timed Treatment: 45 min     Total Treatment Time: 80 min     Based on clinical rationale and outcome measures, this evaluation involved High Complexity decision making due to 3+ personal factors/co morbidities, 3 body structures involved/activity limitations, and unstable symptoms including pelvic pain  PLAN OF CARE:    Goals: (to be met in 12 visits)    STG 4-6 visits  Patient instructed on bladder/ bowel diary, and fluid/ food intake diary for 3 days. Patient instructed on bladder irritants, increased water intake to 64 ounces /day, and increased fiber intake to 25g/ day for bowel/ bladder health. Patient instructed on use of step stool to allow for defecation without straining. Patient instructed on diaphragmatic breathing for parasympathetic nervous stimulation and to allow for increased intra abdominal pressure with defecation. Patient instructed on Levator Ani/ Transverse Abdominis (Pelvic Brace) contraction to prevent Urinary incontinence with ADLs. Patient exhibits an increase in myofascial pelvic floor soft tissue mobility allowing for urination and defecation without painful straining.       LTG 6-12 visits    Patient instructed on Levator Ani contraction inverse to diaphragmatic breathing to allow for pelvic brace with ADLs without valsalva. Patient exhibits an increase in Levator Ani/ Transverse abdominis strength from 3-4/5 with 6 count hold to 4/5  with 10 count hold to allow for pelvic brace with ADLs. Patient reports a change in [address] scale from 3 to 1-0 to allow for intercourse and pelvic exam with minimal pain. Patient understands importance of performing HEP to prevent reoccurrence of symptoms. Pt goals include \"To have intercourse without pain and Understand Menopausal changes. \"      Frequency / Duration: Patient will be seen for 1-2 x/week or a total of 12 visits over a 90 day period. Treatment will include: Neuromuscular re-education, including use of biofeedback to aid in pelvic floor muscle retraining (down training, up training, isolation, and coordination); Manual therapy: soft tissue and joint mobilizations to restore normal joint mechanics, improve pelvic floor muscle and tissue mobility, and decrease pain; Therapeutic exercises including ROM, strengthening; lumbo pelvic strengthening and stabilization training, stretching program; Pt. education for bladder/bowel health, neuroscience principles for pelvic floor rehab, bladder retraining, posture and body mechanics, Functional integration of pelvic floor muscle exercises, Modalities as needed (including ultrasound and e-stimulation), HEP instruction, Dry Needling, and progression. Education or treatment limitation: None  Rehab Potential:good      Patient/Family/Caregiver was advised of these findings, precautions, and treatment options and has agreed to actively participate in planning and for this course of care. Thank you for your referral. Please co-sign or sign and return this letter via fax as soon as possible to [phone]. If you have any questions, please contact me at Dept: [phone]  Sincerely,  Electronically signed by therapist: Lori Rizvi.  Trish, PT, MPT, [address]    Physician's certification required: Yes  I certify the need for these services furnished under this plan of treatment and while under my care.     X___________________________________________________ Date____________________    Certification From: 2/64/5714  To:4/16/2023

## 2023-01-18 ENCOUNTER — OFFICE VISIT (OUTPATIENT)
Dept: PHYSICAL THERAPY | Age: 58
End: 2023-01-18
Attending: OBSTETRICS & GYNECOLOGY
Payer: COMMERCIAL

## 2023-01-18 PROCEDURE — 97110 THERAPEUTIC EXERCISES: CPT

## 2023-01-18 PROCEDURE — 97112 NEUROMUSCULAR REEDUCATION: CPT

## 2023-01-18 PROCEDURE — 97535 SELF CARE MNGMENT TRAINING: CPT

## 2023-01-18 NOTE — PATIENT INSTRUCTIONS
DIAPHRAGMATIC BREATHING     The diaphragm is a dome shaped muscle that forms the floor of the rib cage. It is the most efficient muscle for breathing and using this muscle aides in relaxation. Diaphragmatic breathing is an important technique to learn because it helps settle down or relax the autonomic nervous system. The correct use of diaphragmatic breathing can help to quiet brain activity resulting in the relaxation of all the muscles and organs of the body. This is accomplished by slow rhythmic breathing concentrated in the diaphragm muscle rather than the upper chest.      HOW TO DO PROPER RELAXATION BREATHING  Start by lying on your back or reclining in a chair in a relaxed position. Place your hands around the lower portion of your rib cage. Relax your jaw by placing your tongue on the roof of your mouth and keeping your teeth slightly apart. Take a deep breath in for 4 count through your nose, letting your rib cage widen and your abdomen expand for 4 second hold. Keep your upper chest, neck and shoulders relaxed as you breathe in. As you breathe out for 84count through your mouth, allow your abdomen and chest to fall. Exhale completely then bulge perineum. Remember to breathe slowly. Do not force your breathing. Practice this for _10__minutes. Slippery Stuff with vibrator recommendation. Reviewed Pelvic clock with Myofascial Release to 3:00-9:00 with 4/7/8 Diaphragmatic Breathing (DB)  Obturator Internus contract Relax 30\" then Myofascial Release with relax at 3:00-9:00    Pelvic opener in semi Reclined position with Adductor Stretching and 4/7/8 DB for 5\"    Recommendation on Bullet Vibrator and return [facility]      Learning to coordinate and contract the pelvic floor with exhalation is a practical technique for bladder control.  It is useful to contract the pelvic floor during exhalation which occurs during coughing, sneezing and laughing. For continence the pelvic floor muscles should be trained to contract when you are exhaling. FUNCTIONAL BREATHING EXERCISE  Focus on the relationship between your breathing diaphragm and the pelvic floor muscles. As you breathe in, let the pelvic floor relax. As you exhale, tighten and contract the pelvic floor as if the muscles are helping the air leave your lungs. Start by lying on your back or reclining in a chair in a relaxed position. Place one hand on your chest and the other on your belly. Relax your jaw by placing your tongue on the roof of your mouth so that your teeth are not touching. Take a breath in through your nose, letting the abdomen and ribs expand and rise while you keep your upper chest relaxed. Contract the pelvic floor muscles as you exhale through your mouth for 5 audible count  Practice coordinating the muscles for 5__minutes.

## 2023-01-23 ENCOUNTER — TELEPHONE (OUTPATIENT)
Dept: PHYSICAL THERAPY | Age: 58
End: 2023-01-23

## 2023-01-25 ENCOUNTER — APPOINTMENT (OUTPATIENT)
Dept: PHYSICAL THERAPY | Age: 58
End: 2023-01-25
Payer: COMMERCIAL

## 2023-02-01 ENCOUNTER — APPOINTMENT (OUTPATIENT)
Dept: PHYSICAL THERAPY | Age: 58
End: 2023-02-01
Payer: COMMERCIAL

## 2023-02-03 ENCOUNTER — HOSPITAL ENCOUNTER (OUTPATIENT)
Dept: MAMMOGRAPHY | Facility: HOSPITAL | Age: 58
Discharge: HOME | End: 2023-02-03
Attending: OBSTETRICS & GYNECOLOGY
Payer: COMMERCIAL

## 2023-02-03 DIAGNOSIS — Z12.31 ENCOUNTER FOR SCREENING MAMMOGRAM FOR MALIGNANT NEOPLASM OF BREAST: ICD-10-CM

## 2023-02-03 PROCEDURE — 77063 BREAST TOMOSYNTHESIS BI: CPT | Performed by: OBSTETRICS & GYNECOLOGY

## 2023-02-03 PROCEDURE — 77067 SCR MAMMO BI INCL CAD: CPT | Performed by: OBSTETRICS & GYNECOLOGY

## 2023-02-08 ENCOUNTER — APPOINTMENT (OUTPATIENT)
Dept: PHYSICAL THERAPY | Age: 58
End: 2023-02-08
Payer: COMMERCIAL

## 2023-02-15 ENCOUNTER — APPOINTMENT (OUTPATIENT)
Dept: PHYSICAL THERAPY | Age: 58
End: 2023-02-15
Payer: COMMERCIAL

## 2023-02-22 ENCOUNTER — APPOINTMENT (OUTPATIENT)
Dept: PHYSICAL THERAPY | Age: 58
End: 2023-02-22
Payer: COMMERCIAL

## 2023-03-01 ENCOUNTER — APPOINTMENT (OUTPATIENT)
Dept: PHYSICAL THERAPY | Age: 58
End: 2023-03-01
Payer: COMMERCIAL

## 2023-03-08 ENCOUNTER — APPOINTMENT (OUTPATIENT)
Dept: PHYSICAL THERAPY | Age: 58
End: 2023-03-08
Payer: COMMERCIAL

## 2023-03-15 ENCOUNTER — APPOINTMENT (OUTPATIENT)
Dept: PHYSICAL THERAPY | Age: 58
End: 2023-03-15
Payer: COMMERCIAL

## 2023-07-26 ENCOUNTER — OFFICE VISIT (OUTPATIENT)
Dept: ORTHOPEDICS CLINIC | Facility: CLINIC | Age: 58
End: 2023-07-26
Payer: COMMERCIAL

## 2023-07-26 ENCOUNTER — TELEPHONE (OUTPATIENT)
Dept: ORTHOPEDICS CLINIC | Facility: CLINIC | Age: 58
End: 2023-07-26

## 2023-07-26 ENCOUNTER — TELEPHONE (OUTPATIENT)
Dept: PHYSICAL THERAPY | Facility: HOSPITAL | Age: 58
End: 2023-07-26

## 2023-07-26 DIAGNOSIS — S83.232D COMPLEX TEAR OF MEDIAL MENISCUS OF LEFT KNEE AS CURRENT INJURY, SUBSEQUENT ENCOUNTER: Primary | ICD-10-CM

## 2023-07-26 PROCEDURE — 99215 OFFICE O/P EST HI 40 MIN: CPT | Performed by: ORTHOPAEDIC SURGERY

## 2023-07-26 PROCEDURE — 99417 PROLNG OP E/M EACH 15 MIN: CPT | Performed by: ORTHOPAEDIC SURGERY

## 2023-07-26 NOTE — TELEPHONE ENCOUNTER
CALLED PATIENT TO SCHEDULE SURGERY, SCHEDULE POST OP AND WENT OVER PRE OPERATIVE PROCEDURES.  ALL QUESTIONS ANSWERED

## 2023-07-26 NOTE — TELEPHONE ENCOUNTER
Date of Surgery:  [facility] 8/3/2023    Post Op Appt: 2023 830AM    Case ID: [id number]      Notes: Lets please add at 8/3, [facility] ARTHROSCOPY  Location:  [x] [facility]  Name: Que Meng  MRN: [id number]   : 1965  Diagnosis:  [x] Complex tear of medial meniscus of left knee as current injury, subsequent encounter [G08.333M]  Disposition:    [x] Ambulatory  Operative Time Required: 45 minutes ([facility])  Procedure:  Antibiotics: 2 g cefazolin within 60 minutes of surgical incision (3 g if > 120 kg)  Laterality:                  [] RIGHT                  [x] LEFT                          [] BILATERAL  Procedures:                    [x] Knee Arthroscopy                                                   [x] Partial Medial Meniscectomy (14193)                    [x] Synovectomy (21893)        Additional info:   [] PCP Clearance Needed  [] MRSA  [x] Physical Therapy Internal Chandrika Rodriguez  [] DME Rx  [] Appt with Dr. Humaira Yancey needed  Implants needed: None  Positioning Equipment: Supine with Lateral Post

## 2023-07-26 NOTE — PROGRESS NOTES
OR BOOKING SHEET KNEE ARTHROSCOPY  Location:  [x]  [address]  Name: Sarah Booth  MRN: [id number]   : 1965  Diagnosis:  [x] Complex tear of medial meniscus of left knee as current injury, subsequent encounter [E88.246Q]  Disposition:    [x] Ambulatory  Operative Time Required: 45 minutes ([facility])  Procedure:  Antibiotics: 2 g cefazolin within 60 minutes of surgical incision (3 g if > 120 kg)  Laterality: [] RIGHT  [x] LEFT                       [] BILATERAL  Procedures:   [x] Knee Arthroscopy     [x] Partial Medial Meniscectomy (82508)   [x] Synovectomy (72680)      Additional info:   [] PCP Clearance Needed  [] MRSA  [x] Physical Therapy Internal Merlene Second  [] DME Rx  [] Appt with Dr. Marisel Mendez needed  Implants needed: None  Positioning Equipment: Supine with Lateral Post

## 2023-08-02 DIAGNOSIS — Z98.890 S/P ARTHROSCOPY OF KNEE: Primary | ICD-10-CM

## 2023-08-02 RX ORDER — TRAMADOL HYDROCHLORIDE 50 MG/1
TABLET ORAL
Qty: 20 TABLET | Refills: 1 | Status: SHIPPED | OUTPATIENT
Start: 2023-08-02

## 2023-08-02 RX ORDER — MELOXICAM 15 MG/1
15 TABLET ORAL DAILY
Qty: 14 TABLET | Refills: 1 | Status: SHIPPED | OUTPATIENT
Start: 2023-08-02

## 2023-08-02 RX ORDER — ONDANSETRON 4 MG/1
TABLET, FILM COATED ORAL
Qty: 8 TABLET | Refills: 0 | Status: SHIPPED | OUTPATIENT
Start: 2023-08-02

## 2023-08-03 ENCOUNTER — SURGERY CENTER DOCUMENTATION (OUTPATIENT)
Dept: SURGERY | Age: 58
End: 2023-08-03

## 2023-08-03 NOTE — PROCEDURES
OPERATIVE REPORT  ATTENDING SURGEON: Patrick Moralez MD  ASSISTANT(S): Leeanne Newell [facility]  The aid of assistant Leeanne Crowder was needed for patient positioning, preparation, drape, retraction,  wound closure, dressing application and critical portions of the procedure. PRELIMINARY DIAGNOSIS:  1. Left Medial Meniscus Tear     POSTOPERATIVE DIAGNOSIS:  1. Left Medial Meniscus Tear  2. Left Lateral Meniscus fraying  3. Left Knee synovitis involving multiple compartments    THE OPERATION:  1. Left Knee Arthroscopy, Partial Medial and Lateral Meniscectomy (47434)  2. Left Knee arthroscopic extensive debridement and synovectomy involving multiple compartments (08339)      ANESTHESIA: General Endotracheal  ANESTHESIOLOGIST:  MD Gela  ANTIBIOTICS: Cefazolin 2g within 60 minutes of surgical incision. IMPLANTS:   None  PATHOLOGY/CULTURES: None  ESTIMATED BLOOD LOSS:   5 ml  DRAINS: None  COMPLICATIONS: No intraoperative nor immediate postoperative complications noted. COUNTS: All sponge and needle counts were correct at the conclusion of the procedure. TOURNIQUET TIME: Not Applicable  OPERATIVE FINDINGS:  Displaced flap component of medial meniscus into medial compartment successfully managed with partial medial meniscectomy and resection of approximately 35% of the medial meniscus. Mild peripheral fraying along the posterior aspect of the lateral meniscus debrided to a stable margin. Approximately 5% of the lateral meniscus debrided. Mild chondromalacia of the lateral tibial plateau managed with gentle chondroplasty to a stable margin. Mild to moderate synovitis within the medial and lateral compartments along the patellofemoral compartment managed with synovectomy to a stable margin. Indications:   Jordy Early is a 65year old female with history, physical examination, and imaging findings consistent with medial meniscus pathology that has been managed with extensive nonsurgical management. Physical therapy greater than 3 months, intra-articular corticosteroid and ketorolac injection, activity modification, and anti-inflammatory medications without successful symptomatic resolution. Operative and nonoperative options were discussed with her in my office and we ultimately agreed that surgery would provide the highest likelihood of symptomatic relief and functional improvement. The risks and benefits of surgery as well as the postoperative rehabilitation plan were reviewed. She voiced a good understanding of treatment options, risks and benefits, and alternatives to surgery. She was given the opportunity to ask questions, which were all answered to the best of my ability and to her satisfaction. Nico Chaidez wished to proceed. On the day of surgery, the Nico Chaidez was seen in the preoperative area. I confirmed her identity by name and birthday. We reviewed and confirmed the surgical consent including laterality. The surgical site was marked with my initials. We re-reviewed the risks and benefits of the procedure and I answered all additional questions to her satisfaction. OPERATIVE REPORT:   Nico Chaidez was taken to the operating room in stable condition. A clear 1010 drape x 2 was applied to the upper thigh. An adjustable lateral post was utilized. The extremity underwent a prescrub with chlorhexidine and alcohol followed by a chlorhexidine formal preparation. A preoperative timeout was performed confirming the correct surgical site, procedure, and preoperative imaging was reviewed. Exam under anesthesia demonstrated a Stable knee with normal range of motion. Diagnostic knee arthroscopy was performed with standard anterolateral visualization portal was made utilizing a 15 blade, and an arthroscope was introduced. The medial working portal was established under spinal needle localization and diagnostic arthroscopy was commenced.           Diagnostic arthroscopy revealed:      Suprapatellar No evidence of loose bodies   Patellofemoral Normal Cartilage surface on Patella   Gutters Clear without evidence of loose bodies or osteophytes. Lateral compartment Grade 2 tibial cartilage, gently debrided to a stable margin with 4.0 mm curved mechanical shaver. Grade 1 femoral cartilage  Lateral meniscus peripheral fraying along the posterior margin at zone F.  Debrided with 4.0 mm curved mechanical shaver. Medial compartment Grade 1 tibial cartilage  Grade 1-2 femoral cartilage   Medial meniscus tearing involving Zone A B junction extending into zone B with displaced flap component and mild to moderate undersurface tearing successfully managed with partial medial meniscectomy to a stable margin using 4.0 mm curved mechanical shaver as well as Coblation device. No pathologic plica   Ligaments ACL Intact. PCL intact  Popliteus intact    Other Mild synovitis involving the medial, lateral and patellofemoral compartments managed with synovectomy using curved mechanical 4.0 mm shaver and coablation device. Within the medial compartment, approximately 30% of the meniscus volume was extracted utilizing a curved 4.0 mm mechanical shaver. Peripheral fraying was ablated with a Coblation device to prevent further propagation of tearing. Comprehensive partial meniscectomy was performed. Extensive synovectomy was performed in all 3 compartments with the aid of 4.0 mm curved mechanical shaver and Coblation to ensure hemostasis. Adequate hemostasis were noted. Wound closure was performed with buried 3-0 monocryl and overlying Dermabond and steri strips were applied. At this point in time approximately 30 cc of Marcaine with epinephrine were utilized to provide local anesthesia. 4 x 4 and Tegaderm, [place] style dressing was applied. The knee was wrapped in a 6 inch Ace wrap. The final count prior to closure was correct.  The patient tolerated the procedure well without complications. Ronni Melissa was taken to the recovery room in a stable condition with plan for ambulatory discharge to home. She was provided my postoperative discharge booklet, appropriate home exercises, script for outpatient physical therapy, and a copy of my rehabilitation guidelines. POST OPERATIVE PLAN:  Activity Precautions: WBAT / Isadora Mehta. To begin physical therapy ASAP. DVT Prophylaxis: Not indicated as patient is ambulatory. Follow-up Visit: POD #7-14        Yevgeniy Yancey MD  Knee, Shoulder, & Elbow Surgery / Sports Medicine Specialist  [facility]  [address], [address]   Ray [place]. Tramaine Sales. [email or web address]  t: [phone]  o: [phone]  f: [phone]    This note was dictated using Dragon software. While it was briefly proofread prior to completion, some grammatical, spelling, and word choice errors due to dictation may still occur.

## 2023-08-04 ENCOUNTER — TELEPHONE (OUTPATIENT)
Dept: PHYSICAL THERAPY | Facility: HOSPITAL | Age: 58
End: 2023-08-04

## 2023-08-04 ENCOUNTER — OFFICE VISIT (OUTPATIENT)
Dept: PHYSICAL THERAPY | Age: 58
End: 2023-08-04
Attending: ORTHOPAEDIC SURGERY
Payer: COMMERCIAL

## 2023-08-04 PROCEDURE — 97110 THERAPEUTIC EXERCISES: CPT

## 2023-08-04 PROCEDURE — 97161 PT EVAL LOW COMPLEX 20 MIN: CPT

## 2023-08-04 PROCEDURE — 97140 MANUAL THERAPY 1/> REGIONS: CPT

## 2023-08-04 NOTE — PROGRESS NOTES
PHYSICAL THERAPY INITIAL EVALUATION     Date of service: 8/4/2023  Dx: Complex tear of medial meniscus of left knee as current injury, subsequent encounter (S83.232D)        Insurance: [employer] OPEN ACCESS   Insurance Limits: 60 visit limit  Visit #: 1  Authorized # of Visits: N/A  POC/Auth Expiration: N/A  Authorizing Physician/Provider: Tristan     PATIENT SUMMARY     History/LAURA: The patient reports that she wanted to be able to drive so she only took a Tylenol this morning. The patient reports that she re-injured her knee since she was in for therapy last and she had the meniscus fold on itself. DOI/S: 8/3/23    Aggravating Factors: bending the knee, walking, prolonged standing, stairs     Alleviating Factors: ice, pain medication     PMH: The patient's PMH was reviewed with the patient including allergies, medications, and surgical and medical history. Patient  has a past medical history of Anxiety (02/2017), Dyspareunia, Nut allergy, Problems with swallowing (1976), Stress (Work related), Tubulovillous adenoma of colon (03/31/2021), and Wears glasses (10/2010). PLF/Personal Goals: The patient would like to resume recreational workouts, including possible ballet participation. Occupation: Teacher and     OBJECTIVE:     Pain/Symptom Presentation: Patient reports post-surgical pain in his knee. Pain at rest: 2-3/10  Pain at worst: 5-6/10    Outcomes Measure(s):   LEFS: 78.75/80    Activity Measures:  Sleeping: Mild Activity Limitation: Patient reports some mild interruption in sleeping due to pain. Sitting: Moderate Activity Limitation: Patient is able to sit up to 45 minutes with her knee bent. Standing/Walking After Prolonged Sitting: Mild Activity Limitation: Patient reports stiffness subsides within a few steps. Standing: Moderate Activity Limitation: Patient is able to stand up to 30 minutes at a time. Walking:  Moderate Activity Limitation: Patient ambulates with antalgic gait pattern, limited to walking medium-length distances. Squatting: Moderate Activity Limitation: Patient is able to squat up to 45 deg with weight shift. Ascending Stairs: Moderate Activity Limitation: Patient navigates up the stairs with reciprocal gait pattern with mild deviations. Descending Stairs: Moderate Activity Limitation: Patient navigates down the stairs with step-to gait pattern. Stairs: Moderate Activity Limitation: Patient is able to navigate 2-3 flights of stairs at a time. Inspection/Observation: Patient enters the clinic with a tegaderm dressing and ace wrap over the left knee. Gait: Patient demonstrates antalgic gait pattern, limited TKE at heel strike. Palpation: Patient demonstrates anticipated suprapatellar and posterior knee swelling. Sensation: Patient reports some numbness over the anterior knee and lower limb. ROM:   Knee Motion PROM AROM    Right Left Right Left   Knee Extension 0 -3 0 -5   Knee Flexion 140 100 N/A N/A   *indicates activity was associated with pain    Strength/MMT:   Knee Motion Strength    Right Left   Knee Extension 5/5 3/5   Knee Flexion 5/5 4-/5   *indicates activity was associated with pain    Hip Motion Strength    Right Left   Hip Flexion 5/5 4/5   Hip Extension 4/5 4-/5   Hip ABD 4/5 4-/5   *indicates activity was associated with pain    ASSESSMENT:     Jose Guillermo is a 65year old female that presents to physical therapy evaluation s/p (L) medial and lateral menisectomy 8/3/23 by Dr. Humaira Yancey. The patient demonstrates anticipated deficits in post-operative swelling and pain management. She demonstrated anticipated deficits in knee ROM and ambulates with an antalgic gait pattern. Current functional limitation include but are not limited to prolonged sitting, prolonged standing, walking long distances, squatting, and navigating stairs. The patient would like to resume general health/fitness workouts with possible interest in returning to [facility].  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 The patient would benefit from physical therapy to facilitate improved knee ROM, strength, balance, endurance, and dynamic stability for full return to PLF. Precautions/WB Status: WBAT  Education or Treatment Limitation(s): None  Rehab Potential: Excellent    TREATMENT:     Initial Evaluation: x 15min     Therapeutic Exercise: x 15min  PROM - knee flexion x 10 (L)   PROM - knee extension: x 10 (L)   Quad set: 1 x 20 x 3\"   Heel slide with strap: 1 x 10 (L)   SLR: 2 x 10 (L) - minimal quad lag  Patient Education: Patient was educated on anatomy and pathophysiology of current condition, rationale for physical therapy, anticipated treatment interventions, prognosis, timeline for recovery, and expected functional outcomes based on evaluative findings. Patient was educated on the importance of compliance with consistent treatment and HEP to achieve mutually established goals. All patient's questions were answered and the patient denied further comments, complaints, or concerns upon departure. Administered HEP: Reviewed HEP handout, exercise selection, and recommended resistance. Provided verbal and written instructions/cueing for proper technique and common errors/compensations as needed. Manual Therapy: x 15min  Edema massage x 10min   Sup/inf patellar joint mobs: Grade 3, 4 x 10\" (L)   Med/lat patellar joint mobs: Grade 3, 4 x 20\" (L)      Home Exercise Program: Patient was issued a HEP handout 8/4/2023 including quad set, SLR, heel slide with strap. Charges: PT Eval Low Complexity Complexity x 1, Therex x 1, MT x 1  Total Timed Treatment: 30min       Total Treatment Time: 45min     PLAN OF CARE:      Goals:  Short-Term Goals:  Patient will demonstrate 20 SLR's without quad lag to facilitate proper quad activation and stabilization strength for walking short distances with knee immobilizer brace unlocked.  Timeframe: 2 weeks. Patient will improve knee flexion AROM to 120 to allow for reciprocal stair navigation pattern. Timeframe: 2 weeks. The patient will improve quad strength and single-leg stance stability to demonstrate non-antalgic gait pattern with walking short-length distances for household ambulation. Timeframe: 2-3 weeks. Long-Term Goals: The patient will improve LE strength and endurance to facilitate intermittent standing for extended periods of time for 3-4 hours daily for household and community ambulation. Timeframe: 4 weeks. The patient will improve LE strength and endurance to facilitate walking distances of 3 mile(s) daily for community integration. Timeframe: 6 weeks. Patient will improve lower motor control to perform double-leg squat with symmetrical loading, without asymmetries, and with proper posterior chain loading to facilitate squatting and lifting ADL's. Timeframe: 6 weeks. Patient will improve LE mobility, strength, and single-leg stabilization to meet strength requirements for reciprocal stair navigation pattern with no asymmetries for ascending and descending 5 flights of stairs daily for household and community ambulation. Timeframe: 6 weeks. Patient will improve knee strength and dynamic stability to facilitate return to workout and fitness classes. Timeframe: 6-8 weeks. Plan Frequency / Duration: Patient will be seen for 2x/week for 6 weeks, for a total of 12 visits, over a 90 day period. We will re-evaluate the patient at that time in order to determine functional progress, evaluate short-term goal completion, and establish an updated plan of care. Possible treatment interventions will/may include: Therapeutic Activities, Therapeutic Exercise, Neuromuscular Re-education, Manual Therapy, Home Exercise Program Instruction, Patient Education, Self-Care/Home Management, Gait Training, and Modalities as needed.      Patient/Family was advised of these findings, precautions, and treatment options and has agreed to actively participate in planning and for this course of care. Thank you for your referral. Please co-sign or sign and return this letter via fax as soon as possible to [phone]. If you have any questions, please contact me at Dept: [phone]. Sincerely,    X___Hayley Guajardo, PT, DPT, SCS, ATC, CSCS____ Date: __8/4/2023__________    Electronically signed by therapist: Jose Washington PT  [de-identified] certification required: Yes  I certify the need for these services furnished under this plan of treatment and while under my care.

## 2023-08-09 ENCOUNTER — OFFICE VISIT (OUTPATIENT)
Dept: PHYSICAL THERAPY | Age: 58
End: 2023-08-09
Attending: ORTHOPAEDIC SURGERY
Payer: COMMERCIAL

## 2023-08-09 ENCOUNTER — OFFICE VISIT (OUTPATIENT)
Dept: ORTHOPEDICS CLINIC | Facility: CLINIC | Age: 58
End: 2023-08-09
Payer: COMMERCIAL

## 2023-08-09 DIAGNOSIS — Z98.890 S/P ARTHROSCOPY OF KNEE: Primary | ICD-10-CM

## 2023-08-09 PROCEDURE — 97140 MANUAL THERAPY 1/> REGIONS: CPT

## 2023-08-09 PROCEDURE — 99024 POSTOP FOLLOW-UP VISIT: CPT | Performed by: PHYSICIAN ASSISTANT

## 2023-08-09 PROCEDURE — 97110 THERAPEUTIC EXERCISES: CPT

## 2023-08-09 NOTE — PATIENT INSTRUCTIONS
Thank you for coming to your physical therapy appt! During your appt today you were issued a HEP handout from [email or web address] which can also be accessed using the information below. The exercises chosen are meant to supplement the treatment you received and reinforce the progress made in physical therapy. It is important to stay consistent with your exercises to help facilitate and maximize your recovery! View at [email or web address] using code: Michael Teran

## 2023-08-09 NOTE — PROGRESS NOTES
Date of Service: 8/9/2023  Dx: Complex tear of medial meniscus of left knee as current injury, subsequent encounter (T09.141Y)           DOI/S: 8/3/23   Insurance: N/A  Insurance Limits: 60 visit limit  Visit #: 2  Authorized # of Visits: N/A  POC/Auth Expiration: N/A  Date of Last PN: 8/4/23 (Visit #1)  Authorizing Physician/Provider: René Currie MD  Next MD visit: N/A  Fall Risk: Standard         Precautions: None            Subjective: \"Things are much better. Today, I feel like I'm in good shape. \" She reports that she was able to walk the dog around the block yesterday, some soreness afterward. She reports compliance and good tolerance to his HEP. \"I saw Dr. Donta Granger and Tricia [address] today and they are pleased with my motion. \"    Objective:     Pain/Symptom Presentation:     Pain at rest: 0/10  Pain at worst: 210    ROM:   Knee Motion PROM AROM    Right Left Right Left   Knee Extension 0 0 0 -3   Knee Flexion 140 125 N/A N/A   *indicates activity was associated with pain    Treatment:    Therapeutic Exercise: x 25min  PROM - knee flexion x 10 (L)   PROM - knee extension: x 10 (L)   PROM - knee hyperextension x 10   Quad set: 1 x 20 x 3\"   Heel slide with strap: 1 x 10 (L)   SLR: 2 x 10 (L)  DL bridging: 2 x 10   S/L hip ABD: x 20 (L)   LAQ: 2 x 10 (L)  DLHR: x 20   HEP update: Reviewed new HEP handout with new exercises and progressions, provided verbal and written instructions/cueing for proper technique and common errors/compensations as needed. Reviewed the importance of compliance with home exercise program to facilitate recovery and meet long-term goals. Manual Therapy: x 15min  Edema massage x 10min   Sup/inf patellar joint mobs: Grade 3, 4 x 10\" (L)   Med/lat patellar joint mobs: Grade 3, 4 x 20\" (L)      Provider Interactions With Patient:   Added therapeutic exercises as documented, with cueing provided throughout performance to ensure correct technique during exercise.   Provided patient with a written copy of exercise instruction which was also documented in patient's electronic medical chart. Assessment: Timothy Lefort arrives today after a follow up with her MD to have the occlusive dressing removed. The patient has seen a good improvement since her first appt, taking her dog for a short walk as well as navigating up the stairs reciprocally. The patient does admit to some soreness in her calf and was instructed to monitor closely for possibility of DVT. The patient was still lacking TKE upon arriving to her appt, improved by the end of the session. We progressed the patient's exercises and HEP. We will continue to facilitate normalized ROM and strength for full return to PLF. Goals:   Short-Term Goals:  Patient will demonstrate 20 SLR's without quad lag to facilitate proper quad activation and stabilization strength for walking short distances with knee immobilizer brace unlocked. Timeframe: 2 weeks. Patient will improve knee flexion AROM to 120 to allow for reciprocal stair navigation pattern. Timeframe: 2 weeks. The patient will improve quad strength and single-leg stance stability to demonstrate non-antalgic gait pattern with walking short-length distances for household ambulation. Timeframe: 2-3 weeks. Long-Term Goals: The patient will improve LE strength and endurance to facilitate intermittent standing for extended periods of time for 3-4 hours daily for household and community ambulation. Timeframe: 4 weeks. The patient will improve LE strength and endurance to facilitate walking distances of 3 mile(s) daily for community integration. Timeframe: 6 weeks. Patient will improve lower motor control to perform double-leg squat with symmetrical loading, without asymmetries, and with proper posterior chain loading to facilitate squatting and lifting ADL's. Timeframe: 6 weeks.   Patient will improve LE mobility, strength, and single-leg stabilization to meet strength requirements for reciprocal stair navigation pattern with no asymmetries for ascending and descending 5 flights of stairs daily for household and community ambulation. Timeframe: 6 weeks. Patient will improve knee strength and dynamic stability to facilitate return to workout and fitness classes. Timeframe: 6-8 weeks. Plan: Continue to progress LE strengthening as tolerated. HEP: Patient was issued a HEP handout 8/9/2023 including quad set, SLR, heel slide with strap, DL bridging, S/L hip ABD, and LAQ.        Charges: MT x 1, Therex x 2         Total Timed Treatment: 40min    Total Treatment Time: 40min

## 2023-08-11 ENCOUNTER — OFFICE VISIT (OUTPATIENT)
Dept: PHYSICAL THERAPY | Age: 58
End: 2023-08-11
Attending: ORTHOPAEDIC SURGERY
Payer: COMMERCIAL

## 2023-08-11 PROCEDURE — 97110 THERAPEUTIC EXERCISES: CPT

## 2023-08-11 PROCEDURE — 97140 MANUAL THERAPY 1/> REGIONS: CPT

## 2023-08-11 NOTE — PROGRESS NOTES
Date of Service: 8/11/2023  Dx: Complex tear of medial meniscus of left knee as current injury, subsequent encounter (N28.402M)           DOI/S: 8/3/23   Insurance: N/A  Insurance Limits: 60 visit limit  Visit #: 3  Authorized # of Visits: N/A  POC/Auth Expiration: N/A  Date of Last PN: 8/4/23 (Visit #1)  Authorizing Physician/Provider: Liu Nava MD  Next MD visit: N/A  Fall Risk: Standard         Precautions: None            Subjective: \"The calf pain is so much better. \" The patient reports that she is no longer taking the prescription pain meds, only taking Tylenol. \"If I catch a curb, it'll hurt. I've been working on the extension, but it's just not there. \"     Objective:     Pain/Symptom Presentation:     Pain at rest: 0/10  Pain at worst: 2/10    ROM:   Knee Motion PROM AROM    Right Left Right Left   Knee Extension 0 0 0 -3   Knee Flexion 140 123 N/A N/A   *indicates activity was associated with pain    Treatment:    Therapeutic Exercise: x 25min  PROM - knee flexion x 10 (L)   PROM - knee hyperextension x 10   Quad set: 1 x 20 x 3\"   SLR: 2 x 10 (L), 2# ankle weight  S/L hip ABD: x 20 (L), 2# ankle weight  LAQ: 2 x 10 (L)  DLHR: x 20   Standing hip ABD: 2 x 10 (B)   Standing hip ext: 2 x 10 (B)   Shuttle DLP: x 20, 3 cords    Manual Therapy: x 15min  Edema massage x 10min   Sup/inf patellar joint mobs: Grade 3, 4 x 10\" (L)   Med/lat patellar joint mobs: Grade 3, 4 x 20\" (L)     Provider Interactions With Patient:   Added therapeutic exercises as documented, with cueing provided throughout performance to ensure correct technique during exercise. Verbal and manual cueing on proper performance of the prescribed exercises    Assessment: Efe Santos reports improved calf pain. She is still lacking full TKE actively, but is able to achieve full knee extension passively. The patient was able to progress WB strengthening exercises with good tolerance to this date.  She continues to progress her activity level at home, participating in additional household chores. We will continue to progress the patient as tolerated. Goals:   Short-Term Goals:  Patient will demonstrate 20 SLR's without quad lag to facilitate proper quad activation and stabilization strength for walking short distances with knee immobilizer brace unlocked. Timeframe: 2 weeks. Patient will improve knee flexion AROM to 120 to allow for reciprocal stair navigation pattern. Timeframe: 2 weeks. The patient will improve quad strength and single-leg stance stability to demonstrate non-antalgic gait pattern with walking short-length distances for household ambulation. Timeframe: 2-3 weeks. Long-Term Goals: The patient will improve LE strength and endurance to facilitate intermittent standing for extended periods of time for 3-4 hours daily for household and community ambulation. Timeframe: 4 weeks. The patient will improve LE strength and endurance to facilitate walking distances of 3 mile(s) daily for community integration. Timeframe: 6 weeks. Patient will improve lower motor control to perform double-leg squat with symmetrical loading, without asymmetries, and with proper posterior chain loading to facilitate squatting and lifting ADL's. Timeframe: 6 weeks. Patient will improve LE mobility, strength, and single-leg stabilization to meet strength requirements for reciprocal stair navigation pattern with no asymmetries for ascending and descending 5 flights of stairs daily for household and community ambulation. Timeframe: 6 weeks. Patient will improve knee strength and dynamic stability to facilitate return to workout and fitness classes. Timeframe: 6-8 weeks. Plan: Continue to progress LE strengthening as tolerated. Update HEP next session. HEP: Patient was issued a HEP handout 8/9/2023 including quad set, SLR, heel slide with strap, DL bridging, S/L hip ABD, and LAQ.        Charges: MT x 1, Therex x 2         Total Timed Treatment: 40min    Total Treatment Time: 40min

## 2023-08-15 ENCOUNTER — OFFICE VISIT (OUTPATIENT)
Dept: PHYSICAL THERAPY | Age: 58
End: 2023-08-15
Attending: ORTHOPAEDIC SURGERY
Payer: COMMERCIAL

## 2023-08-15 PROCEDURE — 97140 MANUAL THERAPY 1/> REGIONS: CPT

## 2023-08-15 PROCEDURE — 97110 THERAPEUTIC EXERCISES: CPT

## 2023-08-15 NOTE — PROGRESS NOTES
Date of Service: 8/15/2023  Dx: Complex tear of medial meniscus of left knee as current injury, subsequent encounter (D80.093E)           DOI/S: 8/3/23   Insurance: N/A  Insurance Limits: 60 visit limit  Visit #: 4  Authorized # of Visits: N/A  POC/Auth Expiration: N/A  Date of Last PN: 8/4/23 (Visit #1)  Authorizing Physician/Provider: Tod Esquivel MD  Next MD visit: N/A  Fall Risk: Standard         Precautions: None            Subjective: \"I mowed the front yard on Saturday. It's self-propelled and I took it slow. The only thing is that now that I'm not taking the pain meds, I can feel the ache where the meniscus is. \"     Objective:     Pain/Symptom Presentation:     Pain at rest: 0/10  Pain at worst: 2/10    Treatment:    Therapeutic Activities: x 18min  Shuttle DLP: x 20, 3 cords  Step-up: 2 x 10 (B), 8\" step  Step-down: 2 x 10 (B), 8\" step  Lateral step-up: 1 x 10 (B), 8\" step   Shuttle DLP: x 20, 3 cords     Therapeutic Exercise: x 17min  PROM - knee flexion x 10 (L)   PROM - knee hyperextension x 10   SLR: 2 x 10 (L), 2# ankle weight  LAQ: 2 x 10 (L), 2# ankle weight   Standing hip ABD: 2 x 10 (B)   Standing hip ext: 2 x 10 (B)     Manual Therapy: x 10min  Edema massage, STM to left quad, ITB/VL, and posterior knee x 10min     Provider Interactions With Patient:   Added therapeutic exercises as documented, with cueing provided throughout performance to ensure correct technique during exercise. Verbal and manual cueing on proper performance of the prescribed exercises    Assessment: Timothy Lefort is still lacking full active TKE, but is able to get there passively. The patient was able to add step and stair navigation activities. The patient reports and demonstrates some remaining difficulty descending stairs in comparison to ascending stairs. The patient would benefit from continued therapy for further progression in normal mobility and strength for full return to PLF.     Goals:   Short-Term Goals:  Patient will demonstrate 20 SLR's without quad lag to facilitate proper quad activation and stabilization strength for walking short distances with knee immobilizer brace unlocked. Timeframe: 2 weeks. Patient will improve knee flexion AROM to 120 to allow for reciprocal stair navigation pattern. Timeframe: 2 weeks. The patient will improve quad strength and single-leg stance stability to demonstrate non-antalgic gait pattern with walking short-length distances for household ambulation. Timeframe: 2-3 weeks. Long-Term Goals: The patient will improve LE strength and endurance to facilitate intermittent standing for extended periods of time for 3-4 hours daily for household and community ambulation. Timeframe: 4 weeks. The patient will improve LE strength and endurance to facilitate walking distances of 3 mile(s) daily for community integration. Timeframe: 6 weeks. Patient will improve lower motor control to perform double-leg squat with symmetrical loading, without asymmetries, and with proper posterior chain loading to facilitate squatting and lifting ADL's. Timeframe: 6 weeks. Patient will improve LE mobility, strength, and single-leg stabilization to meet strength requirements for reciprocal stair navigation pattern with no asymmetries for ascending and descending 5 flights of stairs daily for household and community ambulation. Timeframe: 6 weeks. Patient will improve knee strength and dynamic stability to facilitate return to workout and fitness classes. Timeframe: 6-8 weeks. Plan: Add TRX squats and elastic band walks next session. Update HEP next session. HEP: Patient was issued a HEP handout 8/9/2023 including quad set, SLR, heel slide with strap, DL bridging, S/L hip ABD, and LAQ.        Charges: MT x 1, Therex x 2         Total Timed Treatment: 45min    Total Treatment Time: 45min

## 2023-08-17 ENCOUNTER — OFFICE VISIT (OUTPATIENT)
Dept: PHYSICAL THERAPY | Age: 58
End: 2023-08-17
Attending: ORTHOPAEDIC SURGERY
Payer: COMMERCIAL

## 2023-08-17 PROCEDURE — 97110 THERAPEUTIC EXERCISES: CPT

## 2023-08-17 PROCEDURE — 97140 MANUAL THERAPY 1/> REGIONS: CPT

## 2023-08-17 NOTE — PROGRESS NOTES
Date of Service: 8/17/2023  Dx: Complex tear of medial meniscus of left knee as current injury, subsequent encounter (U47.356O)           DOI/S: 8/3/23   Insurance: N/A  Insurance Limits: 60 visit limit  Visit #: 5  Authorized # of Visits: N/A  POC/Auth Expiration: N/A  Date of Last PN: 8/4/23 (Visit #1)  Authorizing Physician/Provider: René Currie MD  Next MD visit: N/A  Fall Risk: Standard         Precautions: None            Subjective: \"I have some residual aches, but I've decided I'm not going to take anything for it\" One steri strip fell off; educated to be cautious with removal of the other strip and not to pull it. Betzaida's scar looked good and she's planning on putting some vaseline on it; educated not to keep it too moist otherwise it will not close. She reports that she no longer needs to take tylenol to go to sleep. Is also able to walk up and down stairs \"like a normal person. \"    Objective:     Pain/Symptom Presentation:     Pain at rest: 0/10  Pain at worst: 1/10    Treatment:    Therapeutic Activities: x 18min  Shuttle DLP: x 20, 3 cords  Step-up: 2 x 10 (B), 8\" step  Step-down: 2 x 10 (B), 8\" step  Lateral step-up: 1 x 10 (B), 8\" step   Shuttle DLP: x 20, 3 cords   Shuttle SLP: 2 x 10 (B)    Therapeutic Exercise: x 17min  PROM - knee flexion x 10 (L)   PROM - knee hyperextension x 10   SLR: 2 x 10 (L), 2# ankle weight  LAQ: 2 x 10 (L), 2# ankle weight   Standing hip ABD: 2 x 10 (B)   Standing hip ext: 2 x 10 (B)     Manual Therapy: x 10min  Edema massage, STM to left quad, ITB/VL, and posterior knee x 10min     Provider Interactions With Patient:   Added therapeutic exercises as documented, with cueing provided throughout performance to ensure correct technique during exercise. Verbal and manual cueing on proper performance of the prescribed exercises    Assessment: Jammie Bustos is still lacking full active TKE, but can go further than last session and is able to reach it passively.  Progressed shuttle to single leg exercises this session which was tolerated well by the patient. She also tolerated passive (L) knee flexion to ~115 degrees while working (R)LE on the shuttle; reported that the stretch felt good. Ronni Melissa continues to demonstrates compliance with HEP and is progressing (L)LE ROM and strength at an appropriate rate. Would continue to benefit from OP PT to regain full (L)LE ROM and strength in order to return to patient to PLOF. Goals:   Short-Term Goals:  Patient will demonstrate 20 SLR's without quad lag to facilitate proper quad activation and stabilization strength for walking short distances with knee immobilizer brace unlocked. Timeframe: 2 weeks. Patient will improve knee flexion AROM to 120 to allow for reciprocal stair navigation pattern. Timeframe: 2 weeks. The patient will improve quad strength and single-leg stance stability to demonstrate non-antalgic gait pattern with walking short-length distances for household ambulation. Timeframe: 2-3 weeks. Long-Term Goals: The patient will improve LE strength and endurance to facilitate intermittent standing for extended periods of time for 3-4 hours daily for household and community ambulation. Timeframe: 4 weeks. The patient will improve LE strength and endurance to facilitate walking distances of 3 mile(s) daily for community integration. Timeframe: 6 weeks. Patient will improve lower motor control to perform double-leg squat with symmetrical loading, without asymmetries, and with proper posterior chain loading to facilitate squatting and lifting ADL's. Timeframe: 6 weeks. Patient will improve LE mobility, strength, and single-leg stabilization to meet strength requirements for reciprocal stair navigation pattern with no asymmetries for ascending and descending 5 flights of stairs daily for household and community ambulation. Timeframe: 6 weeks.   Patient will improve knee strength and dynamic stability to facilitate return to workout and fitness classes. Timeframe: 6-8 weeks. Plan: Continue progressing LE ROM and strength exercises. Add TRX squats, elastic band walks, and update HEP next session. HEP: Patient was issued a HEP handout 8/9/2023 including quad set, SLR, heel slide with strap, DL bridging, S/L hip ABD, and LAQ.        Charges: MT x 1, Therex x 2         Total Timed Treatment: 45min    Total Treatment Time: 45min

## 2023-08-22 ENCOUNTER — OFFICE VISIT (OUTPATIENT)
Dept: PHYSICAL THERAPY | Age: 58
End: 2023-08-22
Attending: ORTHOPAEDIC SURGERY
Payer: COMMERCIAL

## 2023-08-22 PROCEDURE — 97110 THERAPEUTIC EXERCISES: CPT

## 2023-08-22 PROCEDURE — 97140 MANUAL THERAPY 1/> REGIONS: CPT

## 2023-08-23 NOTE — PROGRESS NOTES
Date of Service: 8/22/2023  Dx: Complex tear of medial meniscus of left knee as current injury, subsequent encounter (S97.363C)           DOI/S: 8/3/23   Insurance: N/A  Insurance Limits: 60 visit limit  Visit #: 6  Authorized # of Visits: N/A  POC/Auth Expiration: N/A  Date of Last PN: 8/4/23 (Visit #1)  Authorizing Physician/Provider: Elaine Wallis MD  Next MD visit: N/A  Fall Risk: Standard         Precautions: None             Subjective: \"things are good, its still really stiff. I'm having some random aches. \" She reports that sensation in her L calf is diminished. Aches are in the middle of the day to the evening, sometimes the aches wake her up in the middle of the night. Attempted to kneel yesterday for the first time which she stated was not comfortable this early. Objective:     Pain/Symptom Presentation:     Pain at rest: 0/10  Pain at worst: 1/10    Inspection/Observation: Both steri strips are now gone and the scars look good and clean. Treatment:    Therapeutic Activities: x 15min  Shuttle DLP: x 20, 3 cords   Shuttle SLP: 2 x 10 (B)  TRX squats: x 20  Lateral lunges: 1 x 10 (B)    Therapeutic Exercise: x 18min  PROM - knee flexion x 10 (L)   PROM - knee hyperextension x 10   SLR: 2 x 10 (L), 2# ankle weight  LAQ: 2 x 10 (L), 2# ankle weight   Standing hip ABD: 2 x 10 (B), 2# ankle weight   Standing hip ext: 2 x 10 (B), 2# ankle weight     Manual Therapy: x 10min  Edema massage, STM to left quad, ITB/VL, and posterior knee x 10min      Provider Interactions With Patient:   Progressed therapeutic exercises as documented with visual, verbal, and tactile cues supplied as needed for clear understanding on form and function. Assessment:  Abebe Headley continues to make great gains in both active flexion and extension. Both measurements are functional, however just slightly less than (R) LE, thus there are still gains to be made.  We were able to progress gravity resisted activity this session to include TRX squats and side lunges, both of which were well tolerated by the patient. Sesar Washington is regaining strength, ROM, and overall function in an appropriate time frame and continues to demonstrate compliance with HEP. She would continue to benefit from OP PT to regain full (L)LE ROM and strength in order to return the patient to PLOF. Goals:   Short-Term Goals:  Patient will demonstrate 20 SLR's without quad lag to facilitate proper quad activation and stabilization strength for walking short distances with knee immobilizer brace unlocked. Timeframe: 2 weeks. Patient will improve knee flexion AROM to 120 to allow for reciprocal stair navigation pattern. Timeframe: 2 weeks. The patient will improve quad strength and single-leg stance stability to demonstrate non-antalgic gait pattern with walking short-length distances for household ambulation. Timeframe: 2-3 weeks. Long-Term Goals: The patient will improve LE strength and endurance to facilitate intermittent standing for extended periods of time for 3-4 hours daily for household and community ambulation. Timeframe: 4 weeks. The patient will improve LE strength and endurance to facilitate walking distances of 3 mile(s) daily for community integration. Timeframe: 6 weeks. Patient will improve lower motor control to perform double-leg squat with symmetrical loading, without asymmetries, and with proper posterior chain loading to facilitate squatting and lifting ADL's. Timeframe: 6 weeks. Patient will improve LE mobility, strength, and single-leg stabilization to meet strength requirements for reciprocal stair navigation pattern with no asymmetries for ascending and descending 5 flights of stairs daily for household and community ambulation. Timeframe: 6 weeks. Patient will improve knee strength and dynamic stability to facilitate return to workout and fitness classes. Timeframe: 6-8 weeks. Plan: Continue progressing LE ROM and strength exercises.  Begin focus on gravity resisted BW exercises. HEP: Patient was issued a HEP handout 8/9/2023 including quad set, SLR, heel slide with strap, DL bridging, S/L hip ABD, and LAQ.        Charges: MT x 1, Therex x 2                     Total Timed Treatment: 43min                 Total Treatment Time: 43min

## 2023-08-24 ENCOUNTER — OFFICE VISIT (OUTPATIENT)
Dept: PHYSICAL THERAPY | Age: 58
End: 2023-08-24
Attending: ORTHOPAEDIC SURGERY
Payer: COMMERCIAL

## 2023-08-24 PROCEDURE — 97110 THERAPEUTIC EXERCISES: CPT

## 2023-08-24 PROCEDURE — 97140 MANUAL THERAPY 1/> REGIONS: CPT

## 2023-08-24 NOTE — PATIENT INSTRUCTIONS
Thank you for coming to your physical therapy appt! During your appt today you were issued a HEP handout from [email or web address] which can also be accessed using the information below. The exercises chosen are meant to supplement the treatment you received and reinforce the progress made in physical therapy. It is important to stay consistent with your exercises to help facilitate and maximize your recovery! View at [email or web address] using code: Thanh Holliday

## 2023-08-24 NOTE — PROGRESS NOTES
Date of Service: 8/24/2023  Dx: Complex tear of medial meniscus of left knee as current injury, subsequent encounter (O84.168W)         DOI/S: 8/3/23   Insurance: N/A  Insurance Limits: 60 visit limit  Visit #: 7  Authorized # of Visits: N/A  POC/Auth Expiration: N/A  Date of Last PN: 8/4/23 (Visit #1)  Authorizing Physician/Provider: Ash Madrid MD  Next MD visit: N/A  Fall Risk: Standard         Precautions: None             Subjective: \"Things are pretty much the same. I'm still having some pressure behind the knee. \" She states that its \"nothing she hasn't dealt with before. \" Things are worse at night, but mornings are great. Objective:     Pain/Symptom Presentation:     Pain at rest: 0/10  Pain at worst: 1/10    Inspection/Observation: Scars are clean, slight bump beneath lateral scar indicating a possible stitch    Treatment:    Therapeutic Activities: x 15min  Shuttle DLP: x 20, 4 cords   Shuttle SLP: 2 x 10, 3 cords (B)   TRX squats: x 20 - cues to minimize UE assistance   Lateral lunges: 1 x 10 (B)    Therapeutic Exercise: x 20min  PROM - knee flexion x 10 (L)   PROM - knee hyperextension x 10   Heel taps: 2 x 10 (B), 6in step  SLS on airex pad x 30s (L)  Elastic band lateral walks: 2 laps x 20 ft, yellow theraband  Elastic band base position: 2 laps x 20 ft, yellow theraband  SB bridges: x 10, yellow SB  Patient education: educated that evening soreness may be due to her activity level throughout the day   HEP update: Reviewed new HEP handout with new exercises and progressions, provided verbal and written instructions/cueing for proper technique and common errors/compensations as needed. Reviewed the importance of compliance with home exercise program to facilitate recovery and meet long-term goals.        Manual Therapy: x 10min  Edema massage, STM to left quad, ITB/VL, and posterior knee x 10min      Provider Interactions With Patient:   Progressed therapeutic exercises as documented with cues provided for proper performance. HEP updated with any questions or concerns answered. Assessment:  Sesar Washington is with some remaining soreness in her knee towards the end of the day which is likely due to her activity level during the day. We were able to progress WB strengthening exercises this date as well as balance. The patient demonstrated good stability to SLS, progressed to SLS on airex for uneven surface and additional challenge. The patient was issued an updated HEP handout to reflect recent progressions in her exercise regimen. The patient would benefit from continued therapy for further progression in knee mobility, strength, and endurance for full return to PLF. Goals:   Short-Term Goals:  Patient will demonstrate 20 SLR's without quad lag to facilitate proper quad activation and stabilization strength for walking short distances with knee immobilizer brace unlocked. Timeframe: 2 weeks. Patient will improve knee flexion AROM to 120 to allow for reciprocal stair navigation pattern. Timeframe: 2 weeks. The patient will improve quad strength and single-leg stance stability to demonstrate non-antalgic gait pattern with walking short-length distances for household ambulation. Timeframe: 2-3 weeks. Long-Term Goals: The patient will improve LE strength and endurance to facilitate intermittent standing for extended periods of time for 3-4 hours daily for household and community ambulation. Timeframe: 4 weeks. The patient will improve LE strength and endurance to facilitate walking distances of 3 mile(s) daily for community integration. Timeframe: 6 weeks. Patient will improve lower motor control to perform double-leg squat with symmetrical loading, without asymmetries, and with proper posterior chain loading to facilitate squatting and lifting ADL's. Timeframe: 6 weeks.   Patient will improve LE mobility, strength, and single-leg stabilization to meet strength requirements for reciprocal stair navigation pattern with no asymmetries for ascending and descending 5 flights of stairs daily for household and community ambulation. Timeframe: 6 weeks. Patient will improve knee strength and dynamic stability to facilitate return to workout and fitness classes. Timeframe: 6-8 weeks.       Plan: Continue progressing LE ROM and strength exercises for knee stability     HEP: Patient was issued a HEP handout 8/24/2023 including heel slides, swiss ball bridges, lateral band walk, and base position band walk    Charges: MT x 1, Therex x 2                     Total Timed Treatment: 45min                 Total Treatment Time: 45min

## 2023-08-29 ENCOUNTER — OFFICE VISIT (OUTPATIENT)
Dept: PHYSICAL THERAPY | Age: 58
End: 2023-08-29
Attending: ORTHOPAEDIC SURGERY
Payer: COMMERCIAL

## 2023-08-29 PROCEDURE — 97110 THERAPEUTIC EXERCISES: CPT

## 2023-08-29 PROCEDURE — 97140 MANUAL THERAPY 1/> REGIONS: CPT

## 2023-08-31 ENCOUNTER — OFFICE VISIT (OUTPATIENT)
Dept: PHYSICAL THERAPY | Age: 58
End: 2023-08-31
Attending: ORTHOPAEDIC SURGERY
Payer: COMMERCIAL

## 2023-08-31 PROCEDURE — 97110 THERAPEUTIC EXERCISES: CPT

## 2023-08-31 PROCEDURE — 97140 MANUAL THERAPY 1/> REGIONS: CPT

## 2023-09-05 ENCOUNTER — APPOINTMENT (OUTPATIENT)
Dept: PHYSICAL THERAPY | Age: 58
End: 2023-09-05
Attending: ORTHOPAEDIC SURGERY
Payer: COMMERCIAL

## 2023-09-06 ENCOUNTER — OFFICE VISIT (OUTPATIENT)
Dept: ORTHOPEDICS CLINIC | Facility: CLINIC | Age: 58
End: 2023-09-06
Payer: COMMERCIAL

## 2023-09-06 DIAGNOSIS — Z98.890 S/P ARTHROSCOPY OF KNEE: Primary | ICD-10-CM

## 2023-09-06 PROCEDURE — 99024 POSTOP FOLLOW-UP VISIT: CPT | Performed by: ORTHOPAEDIC SURGERY

## 2023-09-07 ENCOUNTER — OFFICE VISIT (OUTPATIENT)
Dept: PHYSICAL THERAPY | Age: 58
End: 2023-09-07
Attending: ORTHOPAEDIC SURGERY
Payer: COMMERCIAL

## 2023-09-07 PROCEDURE — 97110 THERAPEUTIC EXERCISES: CPT

## 2023-09-07 PROCEDURE — 97140 MANUAL THERAPY 1/> REGIONS: CPT

## 2023-09-07 NOTE — PROGRESS NOTES
Date of Service: 9/7/2023  Dx: Complex tear of medial meniscus of left knee as current injury, subsequent encounter (L06.052P)  DOI/S: 8/3/23   Insurance: N/A  Insurance Limits: 60 visit limit  Visit #: 10  Authorized # of Visits: N/A  POC/Auth Expiration: N/A  Date of Last PN: 8/4/23 (Visit #1)  Authorizing Physician/Provider: Nova Gerard MD  Next MD visit: N/A  Fall Risk: Standard         Precautions: None             Subjective: \"He was pretty adamant that he didn't want me to stop therapy. He was not happy with my being passive about not being to do certain thing. He said that he want it's to be as normal as it can. \" He wanted to put me back on the anti-inflammatory. It's very specific that will cause the swelling versus keeping me from doing what I needed to do. \"     Objective:     Pain/Symptom Presentation:     Pain at rest: 0/10  Pain at worst: 0/10    Treatment:    Therapeutic Activities: x 12min  HEP: heel slides, swiss ball bridges, lateral band walk, and base position band walk  Lateral lunges: 2 x 10 (B), 10# KB   Standing split squat: 1 x 10 (B), pain-free range   Elevated SL squat/heel taps: 2 x 10 (B), 6\" step     Therapeutic Exercise: x 15min  Patient education: scar massage cream recommendations, scar desensitization, anti-inflammatory use   PROM - knee flexion x 10 (L)   PROM - knee extension stretch with overpressure x 10  SB bridges: 2 x 10, yellow SB    Neuromuscular Re-education: x 5min  SLS on dynadisc: x 30\" (B)   Pallof press: 2 x 10 (B), black power band     Manual Therapy: x 10min  Edema massage, STM to left quad, ITB/VL, and posterior knee x 10min      Provider Interactions With Patient:   Progressed therapeutic exercises as documented with verbal and tactile cues for proper biomechanical performance. Assessment: Nico Chaidez arrives today after follow-up with MD who would like her to continue therapy. The patient is still with some limitations in end-range knee flexion.  The patient was educated on desensitization techniques including light rubbing progressing the pressure. Also instructed patient in self-massage for her incisions per MD recommendations. We progressed LE strength and stability exercises this date, which the patient tolerated well. The patient would benefit from continued therapy for normalized mobility, strength, and endurance for full return to PLF. Goals:   Short-Term Goals:  Patient will demonstrate 20 SLR's without quad lag to facilitate proper quad activation and stabilization strength for walking short distances with knee immobilizer brace unlocked. Timeframe: 2 weeks. Patient will improve knee flexion AROM to 120 to allow for reciprocal stair navigation pattern. Timeframe: 2 weeks. The patient will improve quad strength and single-leg stance stability to demonstrate non-antalgic gait pattern with walking short-length distances for household ambulation. Timeframe: 2-3 weeks. Long-Term Goals: The patient will improve LE strength and endurance to facilitate intermittent standing for extended periods of time for 3-4 hours daily for household and community ambulation. Timeframe: 4 weeks. The patient will improve LE strength and endurance to facilitate walking distances of 3 mile(s) daily for community integration. Timeframe: 6 weeks. Patient will improve lower motor control to perform double-leg squat with symmetrical loading, without asymmetries, and with proper posterior chain loading to facilitate squatting and lifting ADL's. Timeframe: 6 weeks. Patient will improve LE mobility, strength, and single-leg stabilization to meet strength requirements for reciprocal stair navigation pattern with no asymmetries for ascending and descending 5 flights of stairs daily for household and community ambulation. Timeframe: 6 weeks. Patient will improve knee strength and dynamic stability to facilitate return to workout and fitness classes. Timeframe: 6-8 weeks.       Plan: Continue to progress knee mobility and strength and stability.      HEP: Patient was issued a HEP handout 8/24/2023 including heel slides, swiss ball bridges, lateral band walk, and base position band walk    Charges: MT x 1, Therex x 2                     Total Timed Treatment: 42min                 Total Treatment Time: 42min

## 2023-09-12 ENCOUNTER — OFFICE VISIT (OUTPATIENT)
Dept: PHYSICAL THERAPY | Age: 58
End: 2023-09-12
Attending: ORTHOPAEDIC SURGERY
Payer: COMMERCIAL

## 2023-09-12 PROCEDURE — 97140 MANUAL THERAPY 1/> REGIONS: CPT

## 2023-09-12 PROCEDURE — 97110 THERAPEUTIC EXERCISES: CPT

## 2023-09-12 PROCEDURE — 97112 NEUROMUSCULAR REEDUCATION: CPT

## 2023-09-14 ENCOUNTER — OFFICE VISIT (OUTPATIENT)
Dept: PHYSICAL THERAPY | Age: 58
End: 2023-09-14
Attending: ORTHOPAEDIC SURGERY
Payer: COMMERCIAL

## 2023-09-14 PROCEDURE — 97110 THERAPEUTIC EXERCISES: CPT

## 2023-09-14 PROCEDURE — 97140 MANUAL THERAPY 1/> REGIONS: CPT

## 2023-09-19 ENCOUNTER — OFFICE VISIT (OUTPATIENT)
Dept: PHYSICAL THERAPY | Age: 58
End: 2023-09-19
Attending: ORTHOPAEDIC SURGERY
Payer: COMMERCIAL

## 2023-09-19 PROCEDURE — 97140 MANUAL THERAPY 1/> REGIONS: CPT

## 2023-09-19 PROCEDURE — 97110 THERAPEUTIC EXERCISES: CPT

## 2023-09-19 NOTE — PROGRESS NOTES
Date of Service: 9/19/2023  Dx: Complex tear of medial meniscus of left knee as current injury, subsequent encounter (Z03.418W)  DOI/S: 8/3/23   Insurance: N/A  Insurance Limits: 60 visit limit  Visit #: 15  Authorized # of Visits: N/A  POC/Auth Expiration: N/A  Date of Last PN: 8/4/23 (Visit #1)  Authorizing Physician/Provider: Rodríguez Haddad MD  Next MD visit: N/A  Fall Risk: Standard         Precautions: None             Subjective: \"things are feeling good, no complaints. It behaved well over the weekend. I did a fast lawn mow and I honestly forgot about my knee. \" She reports that sometimes it aches after a long day, and occasionally swells at the end of the day. Eliza Peterson states that she just rubs it and it feels okay. \" Reports some muscle soreness following last session, but reports that \"I'd rather be sore from working hard than wondering what the point of the therapy session was. \"    Objective:     Pain/Symptom Presentation:     Pain at rest: 0/10  Pain at worst: 0/10    Knee extension AROM: 0  Knee flexion AROM: 132  Knee flexion PROM: 138    Treatment:    Therapeutic Activities: x 10min  HEP: swiss ball bridges, lateral band walk, child's pose, lateral lunges  Lateral lunges c slider: 2 x 10 (B)  Backwards lunges: 2 x 10 (B)    Therapeutic Exercise: x 15min  PROM - knee flexion x 10 (L)   PROM - knee extension stretch with overpressure x 10  SB bridges: 1 x 10, yellow SB  Sherri pose: 3 x 10s hold, nonpainful range  DL squats: 2 x 10, 10# DB    Neuromuscular Re-education: x 3min  Pallof press: 1 x 10 (B), black power band     Manual Therapy: x 12min  Edema massage, STM to left quad, ITB/VL, and posterior knee x 12min      Provider Interactions With Patient:   Continued therapeutic exercises as documented with visual demonstration and verbal cues to ensure safe, proper performance. Assessment: Tenishas knee was more aggravated upon arrival this date.  She reports that on rainy or cold days, she feels like her knee is more irritable. She was educated that a drop in barometric pressure can allow for some excess swelling within the joint, so it is normal that she has a small increase in pain within reason on these days, but it should be manageable. We kept exercises the same this session because she reports some soreness after last session, indicating good intensity of prescribed exercises. Ronni Melissa is improving steadily and continues to make small gains in end range (L) knee ROM. She would benefit from continued therapy for normalized mobility, strength, and endurance for full return to PLOF     Goals:   Short-Term Goals:  Patient will demonstrate 20 SLR's without quad lag to facilitate proper quad activation and stabilization strength for walking short distances with knee immobilizer brace unlocked. Timeframe: 2 weeks. Patient will improve knee flexion AROM to 120 to allow for reciprocal stair navigation pattern. Timeframe: 2 weeks. The patient will improve quad strength and single-leg stance stability to demonstrate non-antalgic gait pattern with walking short-length distances for household ambulation. Timeframe: 2-3 weeks. Long-Term Goals: The patient will improve LE strength and endurance to facilitate intermittent standing for extended periods of time for 3-4 hours daily for household and community ambulation. Timeframe: 4 weeks. The patient will improve LE strength and endurance to facilitate walking distances of 3 mile(s) daily for community integration. Timeframe: 6 weeks. Patient will improve lower motor control to perform double-leg squat with symmetrical loading, without asymmetries, and with proper posterior chain loading to facilitate squatting and lifting ADL's. Timeframe: 6 weeks.   Patient will improve LE mobility, strength, and single-leg stabilization to meet strength requirements for reciprocal stair navigation pattern with no asymmetries for ascending and descending 5 flights of stairs daily for household and community ambulation. Timeframe: 6 weeks. Patient will improve knee strength and dynamic stability to facilitate return to workout and fitness classes. Timeframe: 6-8 weeks.       Plan: Continue progressing DL and SL dynamic strengthening exercises    HEP: Patient was issued a HEP handout 9/14/2023 swiss ball bridges, child's pose, lateral lunges, lateral band walk    Charges: MT x 1, Therex x 2                     Total Timed Treatment: 40min                 Total Treatment Time: 40min

## 2023-09-21 ENCOUNTER — APPOINTMENT (OUTPATIENT)
Dept: PHYSICAL THERAPY | Age: 58
End: 2023-09-21
Attending: ORTHOPAEDIC SURGERY
Payer: COMMERCIAL

## 2023-09-26 ENCOUNTER — APPOINTMENT (OUTPATIENT)
Dept: PHYSICAL THERAPY | Age: 58
End: 2023-09-26
Attending: ORTHOPAEDIC SURGERY
Payer: COMMERCIAL

## 2023-10-06 ENCOUNTER — OFFICE VISIT (OUTPATIENT)
Dept: PHYSICAL THERAPY | Age: 58
End: 2023-10-06
Attending: ORTHOPAEDIC SURGERY
Payer: COMMERCIAL

## 2023-10-06 PROCEDURE — 97140 MANUAL THERAPY 1/> REGIONS: CPT

## 2023-10-06 PROCEDURE — 97110 THERAPEUTIC EXERCISES: CPT

## 2023-10-06 NOTE — PATIENT INSTRUCTIONS
Thank you for coming to your physical therapy appt! During your appt today you were issued a HEP handout from [employer] which can also be accessed using the information below. The exercises chosen are meant to supplement the treatment you received and reinforce the progress made in physical therapy. It is important to stay consistent with your exercises to help facilitate and maximize your recovery! View at [email or web address] using code: [email or web address]

## 2023-10-06 NOTE — PROGRESS NOTES
PHYSICAL THERAPY DISCHARGE:      Date of Service: 10/6/2023  Dx: Complex tear of medial meniscus of left knee as current injury, subsequent encounter (G66.241B)  DOI/S: 8/3/23   Insurance: N/A  Insurance Limits: 60 visit limit  Visit #: 14  Authorized # of Visits: N/A  POC/Auth Expiration: N/A  Date of Last PN: 8/4/23 (Visit #1)  Authorizing Physician/Provider: Luis Pereira MD  Next MD visit: N/A  Fall Risk: Standard         Precautions: None             Subjective: The patient reports that she is doing pretty well. Her knee is a bit aggravated from this weekend, but she feels it in both knees. She is still comfortable with progression to discharge this session. Objective:     Pain/Symptom Presentation:     Pain at rest: 0/10  Pain at worst: 0/10    ROM:          Knee Motion PROM AROM    Right Left Right Left   Knee Extension 0 0 0 0   Knee Flexion 140 135 N/A N/A   *indicates activity was associated with pain     Strength/MMT:        Knee Motion Strength    Right Left   Knee Extension 5/5 4+/5   Knee Flexion 5/5 4+/5   *indicates activity was associated with pain          Hip Motion Strength    Right Left   Hip Flexion 5/5 4+/5   Hip Extension 4/5 4/5   Hip ABD 4/5 4/5   *indicates activity was associated with pain    Treatment:    Therapeutic Activities: x 15min  HEP: swiss ball bridges, lateral band walk, child's pose, lateral lunges  DL squats: 1 x 10, 15# DB  Lateral lunges: 2 x 10 (B)  Backwards lunges: 2 x 10 (B)  Elevated SL squat: 2 x 10 (B), 6\" step     Therapeutic Exercise: x 15min  PROM - knee flexion x 10 (L)   PROM - knee extension stretch with overpressure x 10  SB bridges: 2 x 10, yellow SB  HEP update: Reviewed new HEP handout with new exercises and progressions, provided verbal and written instructions/cueing for proper technique and common errors/compensations as needed. Reviewed the importance of compliance with home exercise program to facilitate recovery and meet long-term goals. Manual Therapy: x 10min  Edema massage, STM to left quad, ITB/VL, and posterior knee       Provider Interactions With Patient:   Continued therapeutic exercises as documented with visual demonstration and verbal cues to ensure safe, proper performance. Assessment: Meera Salcedo is a 65year old female that presented to physical therapy evaluation s/p (L) medial and lateral menisectomy 8/3/23 by Dr. Melany Burgos. Nico Chaidez has been seen for 14 sessions in physical therapy with good progression in knee mobility, strength, endurance, stabilization, and function. The patient demonstrates normalized knee ROM measurements, though still reports some stiffness for kneeling activities, which will improve with time. She has resumed walking, standing, stairs, squatting, household chores, and yardwork without limitations. The patient has met all short and long-term goals and will be discharged from therapy at this time. Goals:   Short-Term Goals:  Patient will demonstrate 20 SLR's without quad lag to facilitate proper quad activation and stabilization strength for walking short distances with knee immobilizer brace unlocked. Timeframe: 2 weeks. (MET 10/6/23)  Patient will improve knee flexion AROM to 120 to allow for reciprocal stair navigation pattern. Timeframe: 2 weeks. (MET 10/6/23)  The patient will improve quad strength and single-leg stance stability to demonstrate non-antalgic gait pattern with walking short-length distances for household ambulation. Timeframe: 2-3 weeks. (MET 10/6/23)  Long-Term Goals: The patient will improve LE strength and endurance to facilitate intermittent standing for extended periods of time for 3-4 hours daily for household and community ambulation. Timeframe: 4 weeks. (MET 10/6/23)  The patient will improve LE strength and endurance to facilitate walking distances of 3 mile(s) daily for community integration. Timeframe: 6 weeks.  (MET 10/6/23)  Patient will improve lower motor control to perform double-leg squat with symmetrical loading, without asymmetries, and with proper posterior chain loading to facilitate squatting and lifting ADL's. Timeframe: 6 weeks. (MET 10/6/23)  Patient will improve LE mobility, strength, and single-leg stabilization to meet strength requirements for reciprocal stair navigation pattern with no asymmetries for ascending and descending 5 flights of stairs daily for household and community ambulation. Timeframe: 6 weeks. (MET 10/6/23)     Plan: Patient will be discharged from therapy to continue with HEP. HEP: Patient was issued a HEP handout 10/6/23 as documented in patient instructions.      Charges: MT x 1, Therex x 2                     Total Timed Treatment: 40min                 Total Treatment Time: 40min

## 2023-10-12 ENCOUNTER — APPOINTMENT (OUTPATIENT)
Dept: PHYSICAL THERAPY | Age: 58
End: 2023-10-12
Attending: ORTHOPAEDIC SURGERY
Payer: COMMERCIAL

## 2024-02-15 ENCOUNTER — ORDER TRANSCRIPTION (OUTPATIENT)
Dept: ADMINISTRATIVE | Facility: HOSPITAL | Age: 59
End: 2024-02-15

## 2024-02-15 DIAGNOSIS — Z12.31 ENCOUNTER FOR SCREENING MAMMOGRAM FOR MALIGNANT NEOPLASM OF BREAST: Primary | ICD-10-CM

## 2024-04-02 PROBLEM — Z86.010 HISTORY OF ADENOMATOUS POLYP OF COLON: Status: ACTIVE | Noted: 2024-04-02

## 2024-04-30 ENCOUNTER — TELEPHONE (OUTPATIENT)
Dept: INTERNAL MEDICINE CLINIC | Facility: CLINIC | Age: 59
End: 2024-04-30

## 2024-05-30 ENCOUNTER — PATIENT OUTREACH (OUTPATIENT)
Dept: CASE MANAGEMENT | Age: 59
End: 2024-05-30

## 2024-05-30 NOTE — PROCEDURES
The office order for PCP removal request is Approved and finalized on May 30, 2024.    Thanks,  Counts include 234 beds at the [facility] Team

## (undated) DEVICE — 3M™ RED DOT™ MONITORING ELECTRODE WITH FOAM TAPE AND STICKY GEL, 50/BAG, 20/CASE, 72/PLT 2570: Brand: RED DOT™

## (undated) DEVICE — Device: Brand: DEFENDO AIR/WATER/SUCTION AND BIOPSY VALVE

## (undated) DEVICE — SNARE CAPTIFLEX MICRO-OVL OLY

## (undated) DEVICE — FILTERLINE NASAL ADULT O2/CO2

## (undated) DEVICE — 1200CC GUARDIAN II: Brand: GUARDIAN

## (undated) DEVICE — ENDOSCOPY PACK UPPER: Brand: MEDLINE INDUSTRIES, INC.

## (undated) NOTE — LETTER
Coronavirus Disease 2019 (COVID-19)     [facility] is committed to the safety and well-being of our patients, members, employees, and communities.  As concerns arise about the new strain of coronavirus that causes COVID-19, [facility] 4. If you have a medical appointment, call the healthcare provider ahead of time and tell them that you have or may have COVID-19.  5. For medical emergencies, call 911 and notify the dispatch personnel that you have or may have COVID-19.   6. Cover your c · At least 10 days have passed since symptoms first appeared OR if asymptomatic patient or date of symptom onset is unclear then use 10 days post COVID test date.    · At least 20 days have passed for severe illness (requiring hospitalization) OR if you are *Some people will be required to have a repeat COVID-19 test in order to be eligible to donate. If you’re instructed by Karen Steel that a repeat test is required, please contact the [address] [facility] COVID-19 Nurse Triage Line at [phone].     Additional Inf